# Patient Record
Sex: FEMALE | Race: WHITE | URBAN - METROPOLITAN AREA
[De-identification: names, ages, dates, MRNs, and addresses within clinical notes are randomized per-mention and may not be internally consistent; named-entity substitution may affect disease eponyms.]

---

## 2020-08-12 ENCOUNTER — APPOINTMENT (RX ONLY)
Dept: URBAN - METROPOLITAN AREA CLINIC 377 | Facility: CLINIC | Age: 55
Setting detail: DERMATOLOGY
End: 2020-08-12

## 2020-08-12 VITALS — TEMPERATURE: 97.5 F

## 2020-08-12 DIAGNOSIS — D18.0 HEMANGIOMA: ICD-10-CM

## 2020-08-12 DIAGNOSIS — L91.8 OTHER HYPERTROPHIC DISORDERS OF THE SKIN: ICD-10-CM

## 2020-08-12 DIAGNOSIS — L30.0 NUMMULAR DERMATITIS: ICD-10-CM

## 2020-08-12 DIAGNOSIS — L81.4 OTHER MELANIN HYPERPIGMENTATION: ICD-10-CM

## 2020-08-12 DIAGNOSIS — Z71.89 OTHER SPECIFIED COUNSELING: ICD-10-CM

## 2020-08-12 DIAGNOSIS — L82.1 OTHER SEBORRHEIC KERATOSIS: ICD-10-CM

## 2020-08-12 DIAGNOSIS — Z85.828 PERSONAL HISTORY OF OTHER MALIGNANT NEOPLASM OF SKIN: ICD-10-CM

## 2020-08-12 DIAGNOSIS — D22 MELANOCYTIC NEVI: ICD-10-CM

## 2020-08-12 PROBLEM — D18.01 HEMANGIOMA OF SKIN AND SUBCUTANEOUS TISSUE: Status: ACTIVE | Noted: 2020-08-12

## 2020-08-12 PROBLEM — D22.5 MELANOCYTIC NEVI OF TRUNK: Status: ACTIVE | Noted: 2020-08-12

## 2020-08-12 PROCEDURE — ? ADDITIONAL NOTES

## 2020-08-12 PROCEDURE — ? COUNSELING

## 2020-08-12 PROCEDURE — ? PRESCRIPTION

## 2020-08-12 PROCEDURE — ? FULL BODY SKIN EXAM

## 2020-08-12 PROCEDURE — ? BENIGN DESTRUCTION COSMETIC

## 2020-08-12 PROCEDURE — 99203 OFFICE O/P NEW LOW 30 MIN: CPT

## 2020-08-12 RX ORDER — TRIAMCINOLONE ACETONIDE 1 MG/G
CREAM TOPICAL BID
Qty: 1 | Refills: 1 | Status: ERX | COMMUNITY
Start: 2020-08-12

## 2020-08-12 RX ADMIN — TRIAMCINOLONE ACETONIDE: 1 CREAM TOPICAL at 00:00

## 2020-08-12 ASSESSMENT — LOCATION SIMPLE DESCRIPTION DERM
LOCATION SIMPLE: CHEST
LOCATION SIMPLE: ABDOMEN
LOCATION SIMPLE: RIGHT UPPER BACK
LOCATION SIMPLE: RIGHT LOWER BACK
LOCATION SIMPLE: GROIN

## 2020-08-12 ASSESSMENT — LOCATION DETAILED DESCRIPTION DERM
LOCATION DETAILED: RIGHT SUPERIOR FLANK
LOCATION DETAILED: LEFT SUPRAPUBIC SKIN
LOCATION DETAILED: RIGHT MID-UPPER BACK
LOCATION DETAILED: RIGHT MEDIAL SUPERIOR CHEST
LOCATION DETAILED: PERIUMBILICAL SKIN
LOCATION DETAILED: RIGHT SUPERIOR LATERAL MIDBACK
LOCATION DETAILED: LEFT SUPERIOR FLANK
LOCATION DETAILED: EPIGASTRIC SKIN

## 2020-08-12 ASSESSMENT — LOCATION ZONE DERM: LOCATION ZONE: TRUNK

## 2020-08-12 NOTE — HPI: EVALUATION OF SKIN LESION(S)
What Type Of Note Output Would You Prefer (Optional)?: Bullet Format
Hpi Title: Evaluation of Skin Lesions
How Severe Are Your Spot(S)?: mild
Have Your Spot(S) Been Treated In The Past?: has not been treated
Additional History: Bcc nose x2 yrs ago tx with Mohs @[facility]

## 2020-08-12 NOTE — HPI: SKIN LESION (SKIN TAGS)
How Severe Are They?: mild
Is This A New Presentation, Or A Follow-Up?: Skin Lesions
Additional History: Multiple possible skin tags back/bra line. Advised if true skin tags this would be separate cosmetic fee.

## 2020-08-12 NOTE — PROCEDURE: BENIGN DESTRUCTION COSMETIC
Anesthesia Volume In Cc: 0.3
Post-Care Instructions: I reviewed with the patient in detail post-care instructions. Patient is to wear sunprotection, and avoid picking at any of the treated lesions. Pt may apply Vaseline to crusted or scabbing areas.
Price (Use Numbers Only, No Special Characters Or $): 100
Consent: The patient's consent was obtained including but not limited to risks of crusting, scabbing, blistering, scarring, darker or lighter pigmentary change, recurrence, incomplete removal and infection.
Detail Level: Simple

## 2022-03-10 ENCOUNTER — HOSPITAL ENCOUNTER (EMERGENCY)
Facility: CLINIC | Age: 57
Discharge: HOME | End: 2022-03-10
Attending: EMERGENCY MEDICINE | Admitting: EMERGENCY MEDICINE
Payer: COMMERCIAL

## 2022-03-10 ENCOUNTER — APPOINTMENT (OUTPATIENT)
Dept: CT IMAGING | Facility: CLINIC | Age: 57
End: 2022-03-10
Attending: EMERGENCY MEDICINE
Payer: COMMERCIAL

## 2022-03-10 VITALS
WEIGHT: 220 LBS | DIASTOLIC BLOOD PRESSURE: 70 MMHG | HEART RATE: 70 BPM | TEMPERATURE: 98.6 F | RESPIRATION RATE: 20 BRPM | HEIGHT: 65 IN | OXYGEN SATURATION: 97 % | BODY MASS INDEX: 36.65 KG/M2 | SYSTOLIC BLOOD PRESSURE: 126 MMHG

## 2022-03-10 DIAGNOSIS — K52.9 ENTERITIS: ICD-10-CM

## 2022-03-10 LAB
ALBUMIN SERPL-MCNC: 3.3 G/DL (ref 3.5–5)
ALBUMIN UR-MCNC: 10 MG/DL
ALP SERPL-CCNC: 50 U/L (ref 45–120)
ALT SERPL W P-5'-P-CCNC: 47 U/L (ref 0–45)
ANION GAP SERPL CALCULATED.3IONS-SCNC: 8 MMOL/L (ref 5–18)
APPEARANCE UR: CLEAR
AST SERPL W P-5'-P-CCNC: 28 U/L (ref 0–40)
BASOPHILS # BLD AUTO: 0 10E3/UL (ref 0–0.2)
BASOPHILS NFR BLD AUTO: 1 %
BILIRUB DIRECT SERPL-MCNC: 0.2 MG/DL
BILIRUB SERPL-MCNC: 0.5 MG/DL (ref 0–1)
BILIRUB UR QL STRIP: NEGATIVE
BUN SERPL-MCNC: 7 MG/DL (ref 8–22)
CALCIUM SERPL-MCNC: 8.7 MG/DL (ref 8.5–10.5)
CHLORIDE BLD-SCNC: 104 MMOL/L (ref 98–107)
CO2 SERPL-SCNC: 27 MMOL/L (ref 22–31)
COLOR UR AUTO: YELLOW
CREAT SERPL-MCNC: 0.67 MG/DL (ref 0.6–1.1)
EOSINOPHIL # BLD AUTO: 0.1 10E3/UL (ref 0–0.7)
EOSINOPHIL NFR BLD AUTO: 2 %
ERYTHROCYTE [DISTWIDTH] IN BLOOD BY AUTOMATED COUNT: 12.7 % (ref 10–15)
GFR SERPL CREATININE-BSD FRML MDRD: >90 ML/MIN/1.73M2
GLUCOSE BLD-MCNC: 107 MG/DL (ref 70–125)
GLUCOSE UR STRIP-MCNC: NEGATIVE MG/DL
HCT VFR BLD AUTO: 39 % (ref 35–47)
HGB BLD-MCNC: 13.3 G/DL (ref 11.7–15.7)
HGB UR QL STRIP: NEGATIVE
IMM GRANULOCYTES # BLD: 0 10E3/UL
IMM GRANULOCYTES NFR BLD: 1 %
KETONES UR STRIP-MCNC: NEGATIVE MG/DL
LEUKOCYTE ESTERASE UR QL STRIP: NEGATIVE
LIPASE SERPL-CCNC: 43 U/L (ref 0–52)
LYMPHOCYTES # BLD AUTO: 1.9 10E3/UL (ref 0.8–5.3)
LYMPHOCYTES NFR BLD AUTO: 46 %
MCH RBC QN AUTO: 30.4 PG (ref 26.5–33)
MCHC RBC AUTO-ENTMCNC: 34.1 G/DL (ref 31.5–36.5)
MCV RBC AUTO: 89 FL (ref 78–100)
MONOCYTES # BLD AUTO: 0.5 10E3/UL (ref 0–1.3)
MONOCYTES NFR BLD AUTO: 12 %
MUCOUS THREADS #/AREA URNS LPF: PRESENT /LPF
NEUTROPHILS # BLD AUTO: 1.5 10E3/UL (ref 1.6–8.3)
NEUTROPHILS NFR BLD AUTO: 38 %
NITRATE UR QL: NEGATIVE
NRBC # BLD AUTO: 0 10E3/UL
NRBC BLD AUTO-RTO: 0 /100
PH UR STRIP: 6.5 [PH] (ref 5–7)
PLATELET # BLD AUTO: 214 10E3/UL (ref 150–450)
POTASSIUM BLD-SCNC: 3.4 MMOL/L (ref 3.5–5)
PROT SERPL-MCNC: 6.2 G/DL (ref 6–8)
RBC # BLD AUTO: 4.37 10E6/UL (ref 3.8–5.2)
RBC URINE: 3 /HPF
SODIUM SERPL-SCNC: 139 MMOL/L (ref 136–145)
SP GR UR STRIP: >1.05 (ref 1–1.03)
SQUAMOUS EPITHELIAL: 2 /HPF
UROBILINOGEN UR STRIP-MCNC: <2 MG/DL
WBC # BLD AUTO: 4 10E3/UL (ref 4–11)
WBC URINE: 1 /HPF

## 2022-03-10 PROCEDURE — 74177 CT ABD & PELVIS W/CONTRAST: CPT

## 2022-03-10 PROCEDURE — 250N000011 HC RX IP 250 OP 636: Performed by: EMERGENCY MEDICINE

## 2022-03-10 PROCEDURE — 36415 COLL VENOUS BLD VENIPUNCTURE: CPT | Performed by: EMERGENCY MEDICINE

## 2022-03-10 PROCEDURE — 85004 AUTOMATED DIFF WBC COUNT: CPT | Performed by: EMERGENCY MEDICINE

## 2022-03-10 PROCEDURE — 83690 ASSAY OF LIPASE: CPT | Performed by: EMERGENCY MEDICINE

## 2022-03-10 PROCEDURE — 82248 BILIRUBIN DIRECT: CPT | Performed by: EMERGENCY MEDICINE

## 2022-03-10 PROCEDURE — 258N000003 HC RX IP 258 OP 636: Performed by: EMERGENCY MEDICINE

## 2022-03-10 PROCEDURE — 96361 HYDRATE IV INFUSION ADD-ON: CPT

## 2022-03-10 PROCEDURE — 81001 URINALYSIS AUTO W/SCOPE: CPT | Performed by: EMERGENCY MEDICINE

## 2022-03-10 PROCEDURE — 96374 THER/PROPH/DIAG INJ IV PUSH: CPT | Mod: 59

## 2022-03-10 PROCEDURE — 99285 EMERGENCY DEPT VISIT HI MDM: CPT | Mod: 25

## 2022-03-10 PROCEDURE — 82310 ASSAY OF CALCIUM: CPT | Performed by: EMERGENCY MEDICINE

## 2022-03-10 RX ORDER — PANTOPRAZOLE SODIUM 20 MG/1
40 TABLET, DELAYED RELEASE ORAL DAILY
Qty: 30 TABLET | Refills: 0 | Status: ON HOLD | OUTPATIENT
Start: 2022-03-10 | End: 2023-12-28

## 2022-03-10 RX ORDER — ONDANSETRON 4 MG/1
4 TABLET, ORALLY DISINTEGRATING ORAL EVERY 8 HOURS PRN
Qty: 10 TABLET | Refills: 0 | Status: SHIPPED | OUTPATIENT
Start: 2022-03-10 | End: 2022-03-13

## 2022-03-10 RX ORDER — HYDROCODONE BITARTRATE AND ACETAMINOPHEN 5; 325 MG/1; MG/1
1 TABLET ORAL EVERY 6 HOURS PRN
Qty: 5 TABLET | Refills: 0 | Status: SHIPPED | OUTPATIENT
Start: 2022-03-10 | End: 2022-03-13

## 2022-03-10 RX ORDER — IOPAMIDOL 755 MG/ML
100 INJECTION, SOLUTION INTRAVASCULAR ONCE
Status: COMPLETED | OUTPATIENT
Start: 2022-03-10 | End: 2022-03-10

## 2022-03-10 RX ORDER — ONDANSETRON 2 MG/ML
4 INJECTION INTRAMUSCULAR; INTRAVENOUS ONCE
Status: COMPLETED | OUTPATIENT
Start: 2022-03-10 | End: 2022-03-10

## 2022-03-10 RX ADMIN — ONDANSETRON 4 MG: 2 INJECTION INTRAMUSCULAR; INTRAVENOUS at 12:17

## 2022-03-10 RX ADMIN — IOPAMIDOL 100 ML: 755 INJECTION, SOLUTION INTRAVENOUS at 12:54

## 2022-03-10 RX ADMIN — SODIUM CHLORIDE 1000 ML: 9 INJECTION, SOLUTION INTRAVENOUS at 12:17

## 2022-03-10 NOTE — ED NOTES
Pt concerned about pain control while at home. MD notified, script added for the pain control. Pt agree's with discharge instructions. Pt advised to eat bland diet, and slowly get back to a regular diet as tolerated.

## 2022-03-10 NOTE — ED PROVIDER NOTES
EMERGENCY DEPARTMENT ENCOUNTER      NAME: Heidy Crain  AGE: 56 year old female  YOB: 1965  MRN: [id number]  EVALUATION DATE & TIME: 3/10/2022 11:45 AM    PCP: No primary care provider on file.    ED PROVIDER: Philippe Gar D.O.      Chief Complaint   Patient presents with     Abdominal Pain     5-6 days       FINAL IMPRESSION:  1. Enteritis        ED COURSE & MEDICAL DECISION MAKIN:52 AM I met with the patient to gather history and to perform my initial exam. I discussed the plan for care while in the Emergency Department.  1:40 PM We discussed the plan for discharge and the patient is agreeable. Reviewed supportive cares, symptomatic treatment, outpatient follow up, and reasons to return to the Emergency Department. Patient to be discharged by ED RN.          Pertinent Labs & Imaging studies reviewed. (See chart for details)  56 year old female presents to the Emergency Department for evaluation of abdominal pain.  Patient's pain was primarily in the upper mid abdomen.  She had a minimally elevated ALT, however I do not believe this to be of significance at this time.  CT imaging of her abdomen does show what appears to be an infectious enteritis (suspect viral), which could easily explain her symptoms based on her exam.  There is no evidence of surgical abdomen to include bowel obstruction, mesenteric ischemia, volvulus, appendicitis, or other emergent process such as pancreatitis or diverticulitis.  At this time I do suspect that her enteritis is the underlying cause of her symptoms and will discharge her with symptomatic control and have her follow-up with her primary care provider as an outpatient.  Return precautions were discussed.    At the conclusion of the encounter I discussed the results of all of the tests and the disposition. The questions were answered. The patient or family acknowledged understanding and was agreeable with the care plan.      HPI    Patient information was  obtained from: Patient    Use of : N/A      Heidy GUY Paras is a 56 year old female who presents with abdominal pain.     Patient reports worsening abdominal pain since 3/8/22. She states that the pain is provoked with drinking, eating, and movement. As a result, patient has not been hydrating well. She describes the pain to radiate throughout the digestive tract, but is worse in the upper quadrants. Her pain is constant in nature. Patient has further not been able to have regular bowel movements or pass gas. She has had a few episodes of emesis. Patient is concerned about a possible obstruction.     Patient has been recovering from the flu and reports her fever broke 3/8 but she still has symptoms of diarrhea. She had a recent surgery on a hiatal hernia ~1 year ago, and a cholecystectomy ~2 years ago. Patient denies any other medical history.     Shx: Patient is a former smoker and occasional drinker.       REVIEW OF SYSTEMS  Constitutional:  Denies fever, chills, weight loss or weakness  Eyes:  No pain, discharge, redness  HENT:  Denies sore throat, ear pain, congestion  Respiratory: No SOB, wheeze or cough  Cardiovascular:  No CP, palpitations  GI:  Positive for abdominal pain, vomit, diarrhea.   : Denies dysuria, hematuria  Musculoskeletal:  Denies any new muscle/joint pain, swelling or loss of function.  Skin:  Denies rash, pallor  Neurologic:  Denies headache, focal weakness or sensory changes  Lymph: Denies swollen nodes    All other systems negative unless noted in HPI.    PAST MEDICAL HISTORY:  No past medical history on file.    PAST SURGICAL HISTORY:  No past surgical history on file.      CURRENT MEDICATIONS:    No current facility-administered medications for this encounter.     Current Outpatient Medications   Medication     ondansetron (ZOFRAN ODT) 4 MG ODT tab     pantoprazole (PROTONIX) 20 MG EC tablet         ALLERGIES:  No Known Allergies    FAMILY HISTORY:  No family history on  "file.    SOCIAL HISTORY:  Social History     Socioeconomic History     Marital status: Not on file     Spouse name: Not on file     Number of children: Not on file     Years of education: Not on file     Highest education level: Not on file   Occupational History     Not on file   Tobacco Use     Smoking status: Not on file     Smokeless tobacco: Not on file   Substance and Sexual Activity     Alcohol use: Not on file     Drug use: Not on file     Sexual activity: Not on file   Other Topics Concern     Not on file   Social History Narrative     Not on file     Social Determinants of Health     Financial Resource Strain: Not on file   Food Insecurity: Not on file   Transportation Needs: Not on file   Physical Activity: Not on file   Stress: Not on file   Social Connections: Not on file   Intimate Partner Violence: Not on file   Housing Stability: Not on file       VITALS:  Patient Vitals for the past 24 hrs:   BP Temp Temp src Pulse Resp Height Weight   03/10/22 1136 (!) 138/90 98.6  F (37  C) Temporal 76 20 1.651 m (5' 5\") 99.8 kg (220 lb)       PHYSICAL EXAM    VITAL SIGNS: BP (!) 138/90   Pulse 76   Temp 98.6  F (37  C) (Temporal)   Resp 20   Ht 1.651 m (5' 5\")   Wt 99.8 kg (220 lb)   BMI 36.61 kg/m      General Appearance: well-nourished. In no acute distress but uncomfortable appearing.   Head:  Normocephalic, without obvious abnormality, atraumatic  Eyes:  PERRL, conjunctiva/corneas clear, EOM's intact,  ENT:  Lips, mucosa, and tongue normal, membranes are moist without pallor  Neck:  Normal ROM, symmetrical, trachea midline    Chest:  No tenderness or deformity, no crepitus  Cardio:  Regular rate and rhythm, no murmur, rub or gallop, 2+ pulses symmetric in all extremities  Pulm:  Clear to auscultation bilaterally, respirations unlabored,  Back:  ROM normal, no CVA tenderness, no spinal tenderness, no paraspinal tenderness  Abdomen:  Soft, no rebound or guarding. Diffuse abdominal tenderness worse in the " upper quadrants.   Musculoskeletal: Full ROM, no edema, no cyanosis, good ROM of major joints  Integument:  Warm, Dry, No erythema, No rash.    Neurologic:  Alert & oriented.  No focal deficits appreciated.  Ambulatory.  Psychiatric:  Affect normal, Judgment normal, Mood normal.      LABS  Results for orders placed or performed during the hospital encounter of 03/10/22 (from the past 24 hour(s))   CBC with platelets + differential    Narrative    The following orders were created for panel order CBC with platelets + differential.  Procedure                               Abnormality         Status                     ---------                               -----------         ------                     CBC with platelets and d...[[id number]]  Abnormal            Final result                 Please view results for these tests on the individual orders.   Basic metabolic panel   Result Value Ref Range    Sodium 139 136 - 145 mmol/L    Potassium 3.4 (L) 3.5 - 5.0 mmol/L    Chloride 104 98 - 107 mmol/L    Carbon Dioxide (CO2) 27 22 - 31 mmol/L    Anion Gap 8 5 - 18 mmol/L    Urea Nitrogen 7 (L) 8 - 22 mg/dL    Creatinine 0.67 0.60 - 1.10 mg/dL    Calcium 8.7 8.5 - 10.5 mg/dL    Glucose 107 70 - 125 mg/dL    GFR Estimate >90 >60 mL/min/1.73m2   Hepatic function panel   Result Value Ref Range    Bilirubin Total 0.5 0.0 - 1.0 mg/dL    Bilirubin Direct 0.2 <=0.5 mg/dL    Protein Total 6.2 6.0 - 8.0 g/dL    Albumin 3.3 (L) 3.5 - 5.0 g/dL    Alkaline Phosphatase 50 45 - 120 U/L    AST 28 0 - 40 U/L    ALT 47 (H) 0 - 45 U/L   Lipase   Result Value Ref Range    Lipase 43 0 - 52 U/L   CBC with platelets and differential   Result Value Ref Range    WBC Count 4.0 4.0 - 11.0 10e3/uL    RBC Count 4.37 3.80 - 5.20 10e6/uL    Hemoglobin 13.3 11.7 - 15.7 g/dL    Hematocrit 39.0 35.0 - 47.0 %    MCV 89 78 - 100 fL    MCH 30.4 26.5 - 33.0 pg    MCHC 34.1 31.5 - 36.5 g/dL    RDW 12.7 10.0 - 15.0 %    Platelet Count 214 150 - 450 10e3/uL     % Neutrophils 38 %    % Lymphocytes 46 %    % Monocytes 12 %    % Eosinophils 2 %    % Basophils 1 %    % Immature Granulocytes 1 %    NRBCs per 100 WBC 0 <1 /100    Absolute Neutrophils 1.5 (L) 1.6 - 8.3 10e3/uL    Absolute Lymphocytes 1.9 0.8 - 5.3 10e3/uL    Absolute Monocytes 0.5 0.0 - 1.3 10e3/uL    Absolute Eosinophils 0.1 0.0 - 0.7 10e3/uL    Absolute Basophils 0.0 0.0 - 0.2 10e3/uL    Absolute Immature Granulocytes 0.0 <=0.4 10e3/uL    Absolute NRBCs 0.0 10e3/uL   CT Abdomen Pelvis w Contrast    Narrative    EXAM: CT ABDOMEN PELVIS W CONTRAST  LOCATION: [facility]  DATE/TIME: 3/10/2022 12:53 PM    INDICATION: Abdominal pain, evaluate for obstruction  COMPARISON: None.  TECHNIQUE: CT scan of the abdomen and pelvis was performed following injection of IV contrast. Multiplanar reformats were obtained. Dose reduction techniques were used.  CONTRAST: 100ml Isovue 370    FINDINGS:   LOWER CHEST: There is a 1.2 x 2.0 cm enlarged right cardiophrenic lymph nodes in the lower mediastinum, which has fat attenuation hilum.    HEPATOBILIARY: Prior cholecystectomy. No bile duct enlargement. ~5 mm low-attenuation lesion in the posterior right lobe (series 3, image 57) is too small to characterize likely a small cyst or hemangioma. No other liver parenchymal lesions. Smooth liver   capsule.    PANCREAS: Normal.    SPLEEN: Normal.    ADRENAL GLANDS: Normal.    KIDNEYS/BLADDER: 3-4 mm nonobstructing calculus lower pole right kidney (series 3, image 99). Left kidney is normal in size without nephrolithiasis. Renal parenchymal enhancement and cortical thickness is normal. Ureters are decompressed. Urinary bladder   is normal.    BOWEL: Nondistended stomach. The proximal small bowel is normal caliber. There are several loops of the mid small bowel in the central abdomen which have wall thickening, mucosal hyperenhancement and submucosal edema (series 5, image 25). The distal   small bowel including the  terminal ileum has normal wall thickness. Normal colonic stool burden. No colon wall thickening. Normal appendix.    LYMPH NODES: There are a few reactive appearing lymph nodes in the small bowel mesentery largest of which measures 8 mm short axis (series 3, image 104). No leo hepatis, aortocaval, or pelvic adenopathy.    VASCULATURE: Normal caliber abdominal aorta and iliac arteries.    PELVIC ORGANS: Small amount of free fluid is present layering into the pelvis. The uterus is normal in size. Normal adnexa.    MUSCULOSKELETAL: Degenerative disc space narrowing at L5-S1. Trace degenerative anterolisthesis of L4 on L5. Lower thoracic and lumbar vertebra are maintained in height. There are no aggressive or destructive bone lesions. Body wall soft tissues are   symmetric. Subcentimeter fat-containing periumbilical hernia 1.3 cm above the umbilicus.      Impression    IMPRESSION:     1.  Several loops of the mid small bowel in the central abdomen have mucosal enhancement and submucosal edema producing wall thickening consistent with enteritis, most likely infectious. There is no associated mechanical obstruction.  2.  Solitary 3-4 mm nonobstructing right renal calculus.           RADIOLOGY  CT Abdomen Pelvis w Contrast   Final Result   IMPRESSION:       1.  Several loops of the mid small bowel in the central abdomen have mucosal enhancement and submucosal edema producing wall thickening consistent with enteritis, most likely infectious. There is no associated mechanical obstruction.   2.  Solitary 3-4 mm nonobstructing right renal calculus.              MEDICATIONS GIVEN IN THE EMERGENCY:  Medications   0.9% sodium chloride BOLUS (1,000 mLs Intravenous New Bag 3/10/22 1217)   ondansetron (ZOFRAN) injection 4 mg (4 mg Intravenous Given 3/10/22 1217)   iopamidol (ISOVUE-370) solution 100 mL (100 mLs Intravenous Given 3/10/22 1254)       NEW PRESCRIPTIONS STARTED AT TODAY'S ER VISIT  New Prescriptions    ONDANSETRON  (ZOFRAN ODT) 4 MG ODT TAB    Take 1 tablet (4 mg) by mouth every 8 hours as needed for nausea    PANTOPRAZOLE (PROTONIX) 20 MG EC TABLET    Take 2 tablets (40 mg) by mouth daily        I, Evelia Guerrero, am serving as a scribe to document services personally performed by Philippe Gar, based on my observation and the provider's statements to me. I, Philippe Gar DO, attest that Evelia Guerrero is acting in a scribe capacity, has observed my performance of the services and has documented them in accordance with my direction.    Philippe Gar D.O.  Emergency Medicine  [facility] EMERGENCY ROOM  [facility] [facility] [id number] [phone]  Dept: [phone]     Philippe Gar DO  03/10/22 [id number]

## 2022-03-10 NOTE — ED TRIAGE NOTES
Larissa patient.  Has had previous hernia surgeries.  Has been sick since Saturday night.  1st 2 days vomiting and diarrhea. Then gas. Used immodium  Now not having any BM today.    Worried about adhesins and HAYLEY

## 2023-12-27 PROCEDURE — 96374 THER/PROPH/DIAG INJ IV PUSH: CPT | Mod: 59

## 2023-12-27 PROCEDURE — 99285 EMERGENCY DEPT VISIT HI MDM: CPT | Mod: 25

## 2023-12-27 PROCEDURE — 96375 TX/PRO/DX INJ NEW DRUG ADDON: CPT

## 2023-12-27 PROCEDURE — 96361 HYDRATE IV INFUSION ADD-ON: CPT

## 2023-12-28 ENCOUNTER — HOSPITAL ENCOUNTER (INPATIENT)
Facility: CLINIC | Age: 58
LOS: 3 days | Discharge: HOME | End: 2023-12-31
Attending: INTERNAL MEDICINE | Admitting: STUDENT IN AN ORGANIZED HEALTH CARE EDUCATION/TRAINING PROGRAM
Payer: COMMERCIAL

## 2023-12-28 ENCOUNTER — APPOINTMENT (OUTPATIENT)
Dept: CT IMAGING | Facility: CLINIC | Age: 58
End: 2023-12-28
Attending: EMERGENCY MEDICINE
Payer: COMMERCIAL

## 2023-12-28 ENCOUNTER — APPOINTMENT (OUTPATIENT)
Dept: ULTRASOUND IMAGING | Facility: CLINIC | Age: 58
End: 2023-12-28
Attending: EMERGENCY MEDICINE
Payer: COMMERCIAL

## 2023-12-28 ENCOUNTER — HOSPITAL ENCOUNTER (EMERGENCY)
Facility: CLINIC | Age: 58
Discharge: ANOTHER HOSPITAL | End: 2023-12-28
Attending: EMERGENCY MEDICINE | Admitting: EMERGENCY MEDICINE
Payer: COMMERCIAL

## 2023-12-28 VITALS
HEART RATE: 77 BPM | OXYGEN SATURATION: 98 % | RESPIRATION RATE: 17 BRPM | TEMPERATURE: 98.1 F | SYSTOLIC BLOOD PRESSURE: 135 MMHG | DIASTOLIC BLOOD PRESSURE: 71 MMHG

## 2023-12-28 DIAGNOSIS — I10 PRIMARY HYPERTENSION: ICD-10-CM

## 2023-12-28 DIAGNOSIS — F41.1 ANXIETY STATE: ICD-10-CM

## 2023-12-28 DIAGNOSIS — F10.90 HEAVY ALCOHOL USE: ICD-10-CM

## 2023-12-28 DIAGNOSIS — K85.90 ACUTE PANCREATITIS, UNSPECIFIED COMPLICATION STATUS, UNSPECIFIED PANCREATITIS TYPE: ICD-10-CM

## 2023-12-28 DIAGNOSIS — F10.10 ALCOHOL ABUSE: Primary | ICD-10-CM

## 2023-12-28 PROBLEM — K44.9 DIAPHRAGMATIC HERNIA: Status: ACTIVE | Noted: 2020-09-22

## 2023-12-28 PROBLEM — H04.123 DRY EYES: Status: ACTIVE | Noted: 2023-12-28

## 2023-12-28 PROBLEM — F32.0 DEPRESSION, MAJOR, SINGLE EPISODE, MILD (H): Status: ACTIVE | Noted: 2021-05-12

## 2023-12-28 PROBLEM — E66.811 OBESITY, CLASS I, BMI 30-34.9: Status: ACTIVE | Noted: 2020-09-22

## 2023-12-28 LAB
ALBUMIN SERPL BCG-MCNC: 4.2 G/DL (ref 3.5–5.2)
ALP SERPL-CCNC: 93 U/L (ref 40–150)
ALT SERPL W P-5'-P-CCNC: 38 U/L (ref 0–50)
ANION GAP SERPL CALCULATED.3IONS-SCNC: 9 MMOL/L (ref 7–15)
AST SERPL W P-5'-P-CCNC: 21 U/L (ref 0–45)
BASOPHILS # BLD AUTO: 0 10E3/UL (ref 0–0.2)
BASOPHILS NFR BLD AUTO: 1 %
BILIRUB SERPL-MCNC: 0.4 MG/DL
BUN SERPL-MCNC: 8.1 MG/DL (ref 8–23)
CALCIUM SERPL-MCNC: 9.1 MG/DL (ref 8.8–10.2)
CHLORIDE SERPL-SCNC: 102 MMOL/L (ref 98–107)
CHOLEST SERPL-MCNC: 208 MG/DL
CREAT SERPL-MCNC: 0.55 MG/DL (ref 0.51–0.95)
DEPRECATED HCO3 PLAS-SCNC: 28 MMOL/L (ref 22–29)
EGFRCR SERPLBLD CKD-EPI 2021: >90 ML/MIN/1.73M2
EOSINOPHIL # BLD AUTO: 0.3 10E3/UL (ref 0–0.7)
EOSINOPHIL NFR BLD AUTO: 3 %
ERYTHROCYTE [DISTWIDTH] IN BLOOD BY AUTOMATED COUNT: 13 % (ref 10–15)
ETHANOL SERPL-MCNC: <0.01 G/DL
GLUCOSE SERPL-MCNC: 113 MG/DL (ref 70–99)
HCT VFR BLD AUTO: 38.8 % (ref 35–47)
HDLC SERPL-MCNC: 43 MG/DL
HGB BLD-MCNC: 13.2 G/DL (ref 11.7–15.7)
IMM GRANULOCYTES # BLD: 0.1 10E3/UL
IMM GRANULOCYTES NFR BLD: 1 %
LDLC SERPL CALC-MCNC: 124 MG/DL
LIPASE SERPL-CCNC: >3000 U/L (ref 13–60)
LYMPHOCYTES # BLD AUTO: 2.4 10E3/UL (ref 0.8–5.3)
LYMPHOCYTES NFR BLD AUTO: 28 %
MCH RBC QN AUTO: 30.6 PG (ref 26.5–33)
MCHC RBC AUTO-ENTMCNC: 34 G/DL (ref 31.5–36.5)
MCV RBC AUTO: 90 FL (ref 78–100)
MONOCYTES # BLD AUTO: 0.6 10E3/UL (ref 0–1.3)
MONOCYTES NFR BLD AUTO: 7 %
NEUTROPHILS # BLD AUTO: 5.1 10E3/UL (ref 1.6–8.3)
NEUTROPHILS NFR BLD AUTO: 60 %
NONHDLC SERPL-MCNC: 165 MG/DL
NRBC # BLD AUTO: 0 10E3/UL
NRBC BLD AUTO-RTO: 0 /100
PLATELET # BLD AUTO: 201 10E3/UL (ref 150–450)
POTASSIUM SERPL-SCNC: 4.1 MMOL/L (ref 3.4–5.3)
PROT SERPL-MCNC: 7 G/DL (ref 6.4–8.3)
RBC # BLD AUTO: 4.31 10E6/UL (ref 3.8–5.2)
SODIUM SERPL-SCNC: 139 MMOL/L (ref 135–145)
TRIGL SERPL-MCNC: 203 MG/DL
WBC # BLD AUTO: 8.5 10E3/UL (ref 4–11)

## 2023-12-28 PROCEDURE — 96375 TX/PRO/DX INJ NEW DRUG ADDON: CPT

## 2023-12-28 PROCEDURE — 258N000003 HC RX IP 258 OP 636: Performed by: EMERGENCY MEDICINE

## 2023-12-28 PROCEDURE — 36415 COLL VENOUS BLD VENIPUNCTURE: CPT | Performed by: EMERGENCY MEDICINE

## 2023-12-28 PROCEDURE — 82077 ASSAY SPEC XCP UR&BREATH IA: CPT | Performed by: EMERGENCY MEDICINE

## 2023-12-28 PROCEDURE — 250N000011 HC RX IP 250 OP 636: Performed by: EMERGENCY MEDICINE

## 2023-12-28 PROCEDURE — 82465 ASSAY BLD/SERUM CHOLESTEROL: CPT | Performed by: PHYSICIAN ASSISTANT

## 2023-12-28 PROCEDURE — 96361 HYDRATE IV INFUSION ADD-ON: CPT

## 2023-12-28 PROCEDURE — 99223 1ST HOSP IP/OBS HIGH 75: CPT | Performed by: PHYSICIAN ASSISTANT

## 2023-12-28 PROCEDURE — 76705 ECHO EXAM OF ABDOMEN: CPT

## 2023-12-28 PROCEDURE — 250N000013 HC RX MED GY IP 250 OP 250 PS 637: Performed by: PHYSICIAN ASSISTANT

## 2023-12-28 PROCEDURE — 258N000003 HC RX IP 258 OP 636: Performed by: PHYSICIAN ASSISTANT

## 2023-12-28 PROCEDURE — 80053 COMPREHEN METABOLIC PANEL: CPT | Performed by: EMERGENCY MEDICINE

## 2023-12-28 PROCEDURE — 250N000011 HC RX IP 250 OP 636: Performed by: PHYSICIAN ASSISTANT

## 2023-12-28 PROCEDURE — 96374 THER/PROPH/DIAG INJ IV PUSH: CPT | Mod: 59

## 2023-12-28 PROCEDURE — 120N000001 HC R&B MED SURG/OB

## 2023-12-28 PROCEDURE — 36415 COLL VENOUS BLD VENIPUNCTURE: CPT | Performed by: PHYSICIAN ASSISTANT

## 2023-12-28 PROCEDURE — 85025 COMPLETE CBC W/AUTO DIFF WBC: CPT | Performed by: EMERGENCY MEDICINE

## 2023-12-28 PROCEDURE — 83690 ASSAY OF LIPASE: CPT | Performed by: EMERGENCY MEDICINE

## 2023-12-28 PROCEDURE — 74177 CT ABD & PELVIS W/CONTRAST: CPT

## 2023-12-28 RX ORDER — TEA TREE OIL 100 %
1 OIL (ML) TOPICAL 2 TIMES DAILY
COMMUNITY

## 2023-12-28 RX ORDER — ASCORBIC ACID 500 MG
1 TABLET ORAL 2 TIMES DAILY
COMMUNITY

## 2023-12-28 RX ORDER — PROCHLORPERAZINE 25 MG
25 SUPPOSITORY, RECTAL RECTAL EVERY 12 HOURS PRN
Status: DISCONTINUED | OUTPATIENT
Start: 2023-12-28 | End: 2023-12-31 | Stop reason: HOSPADM

## 2023-12-28 RX ORDER — CYCLOSPORINE 0.5 MG/ML
1 EMULSION OPHTHALMIC 2 TIMES DAILY
Status: DISCONTINUED | OUTPATIENT
Start: 2023-12-28 | End: 2023-12-31 | Stop reason: HOSPADM

## 2023-12-28 RX ORDER — SENNOSIDES 8.6 MG
1 TABLET ORAL AT BEDTIME
COMMUNITY

## 2023-12-28 RX ORDER — DULOXETIN HYDROCHLORIDE 30 MG/1
60 CAPSULE, DELAYED RELEASE ORAL DAILY
Status: DISCONTINUED | OUTPATIENT
Start: 2023-12-28 | End: 2023-12-28

## 2023-12-28 RX ORDER — CHOLECALCIFEROL (VITAMIN D3) 50 MCG
1 TABLET ORAL DAILY
COMMUNITY

## 2023-12-28 RX ORDER — HYDROMORPHONE HYDROCHLORIDE 1 MG/ML
0.5 INJECTION, SOLUTION INTRAMUSCULAR; INTRAVENOUS; SUBCUTANEOUS ONCE
Status: COMPLETED | OUTPATIENT
Start: 2023-12-28 | End: 2023-12-28

## 2023-12-28 RX ORDER — DULOXETIN HYDROCHLORIDE 30 MG/1
60 CAPSULE, DELAYED RELEASE ORAL DAILY
Status: DISCONTINUED | OUTPATIENT
Start: 2023-12-28 | End: 2023-12-31 | Stop reason: HOSPADM

## 2023-12-28 RX ORDER — OXYCODONE HYDROCHLORIDE 5 MG/1
5 TABLET ORAL EVERY 4 HOURS PRN
Status: DISCONTINUED | OUTPATIENT
Start: 2023-12-28 | End: 2023-12-31 | Stop reason: HOSPADM

## 2023-12-28 RX ORDER — PROCHLORPERAZINE MALEATE 10 MG
10 TABLET ORAL EVERY 6 HOURS PRN
Status: DISCONTINUED | OUTPATIENT
Start: 2023-12-28 | End: 2023-12-31 | Stop reason: HOSPADM

## 2023-12-28 RX ORDER — SALIVA STIMULANT COMB. NO.3
2 SPRAY, NON-AEROSOL (ML) MUCOUS MEMBRANE
Status: DISCONTINUED | OUTPATIENT
Start: 2023-12-28 | End: 2023-12-31 | Stop reason: HOSPADM

## 2023-12-28 RX ORDER — IOPAMIDOL 755 MG/ML
90 INJECTION, SOLUTION INTRAVASCULAR ONCE
Status: COMPLETED | OUTPATIENT
Start: 2023-12-28 | End: 2023-12-28

## 2023-12-28 RX ORDER — NALOXONE HYDROCHLORIDE 0.4 MG/ML
0.4 INJECTION, SOLUTION INTRAMUSCULAR; INTRAVENOUS; SUBCUTANEOUS
Status: DISCONTINUED | OUTPATIENT
Start: 2023-12-28 | End: 2023-12-31 | Stop reason: HOSPADM

## 2023-12-28 RX ORDER — CYCLOSPORINE 0.5 MG/ML
1 EMULSION OPHTHALMIC 2 TIMES DAILY
COMMUNITY
Start: 2023-11-03

## 2023-12-28 RX ORDER — AMOXICILLIN 250 MG
2 CAPSULE ORAL 2 TIMES DAILY PRN
Status: DISCONTINUED | OUTPATIENT
Start: 2023-12-28 | End: 2023-12-31 | Stop reason: HOSPADM

## 2023-12-28 RX ORDER — DULOXETIN HYDROCHLORIDE 60 MG/1
60 CAPSULE, DELAYED RELEASE ORAL DAILY
COMMUNITY

## 2023-12-28 RX ORDER — VITAMIN B COMPLEX
1 CAPSULE ORAL DAILY
Status: ON HOLD | COMMUNITY
End: 2024-08-03

## 2023-12-28 RX ORDER — EVENING PRIMROSE OIL 500 MG
1 CAPSULE ORAL EVERY EVENING
COMMUNITY

## 2023-12-28 RX ORDER — NALOXONE HYDROCHLORIDE 0.4 MG/ML
0.2 INJECTION, SOLUTION INTRAMUSCULAR; INTRAVENOUS; SUBCUTANEOUS
Status: DISCONTINUED | OUTPATIENT
Start: 2023-12-28 | End: 2023-12-31 | Stop reason: HOSPADM

## 2023-12-28 RX ORDER — AMOXICILLIN 250 MG
1 CAPSULE ORAL 2 TIMES DAILY PRN
Status: DISCONTINUED | OUTPATIENT
Start: 2023-12-28 | End: 2023-12-31 | Stop reason: HOSPADM

## 2023-12-28 RX ORDER — ACETAMINOPHEN 325 MG/1
650 TABLET ORAL EVERY 4 HOURS PRN
Status: DISCONTINUED | OUTPATIENT
Start: 2023-12-28 | End: 2023-12-31 | Stop reason: HOSPADM

## 2023-12-28 RX ORDER — SODIUM CHLORIDE 9 MG/ML
INJECTION, SOLUTION INTRAVENOUS CONTINUOUS
Status: DISCONTINUED | OUTPATIENT
Start: 2023-12-28 | End: 2023-12-28 | Stop reason: HOSPADM

## 2023-12-28 RX ORDER — TRIAMCINOLONE ACETONIDE 1 MG/G
OINTMENT TOPICAL 2 TIMES DAILY PRN
COMMUNITY
Start: 2023-12-11

## 2023-12-28 RX ORDER — HYDROMORPHONE HCL IN WATER/PF 6 MG/30 ML
0.2 PATIENT CONTROLLED ANALGESIA SYRINGE INTRAVENOUS
Status: DISCONTINUED | OUTPATIENT
Start: 2023-12-28 | End: 2023-12-29

## 2023-12-28 RX ORDER — TACROLIMUS 1 MG/G
OINTMENT TOPICAL 2 TIMES DAILY PRN
COMMUNITY
Start: 2023-12-09

## 2023-12-28 RX ORDER — HYDROMORPHONE HCL IN WATER/PF 6 MG/30 ML
0.4 PATIENT CONTROLLED ANALGESIA SYRINGE INTRAVENOUS
Status: DISCONTINUED | OUTPATIENT
Start: 2023-12-28 | End: 2023-12-29

## 2023-12-28 RX ORDER — LIDOCAINE 40 MG/G
CREAM TOPICAL
Status: DISCONTINUED | OUTPATIENT
Start: 2023-12-28 | End: 2023-12-31 | Stop reason: HOSPADM

## 2023-12-28 RX ORDER — ONDANSETRON 4 MG/1
4 TABLET, ORALLY DISINTEGRATING ORAL EVERY 6 HOURS PRN
Status: DISCONTINUED | OUTPATIENT
Start: 2023-12-28 | End: 2023-12-31 | Stop reason: HOSPADM

## 2023-12-28 RX ORDER — ONDANSETRON 2 MG/ML
4 INJECTION INTRAMUSCULAR; INTRAVENOUS EVERY 6 HOURS PRN
Status: DISCONTINUED | OUTPATIENT
Start: 2023-12-28 | End: 2023-12-31 | Stop reason: HOSPADM

## 2023-12-28 RX ORDER — SODIUM CHLORIDE, SODIUM LACTATE, POTASSIUM CHLORIDE, CALCIUM CHLORIDE 600; 310; 30; 20 MG/100ML; MG/100ML; MG/100ML; MG/100ML
INJECTION, SOLUTION INTRAVENOUS CONTINUOUS
Status: DISCONTINUED | OUTPATIENT
Start: 2023-12-28 | End: 2023-12-31

## 2023-12-28 RX ORDER — KETOROLAC TROMETHAMINE 15 MG/ML
15 INJECTION, SOLUTION INTRAMUSCULAR; INTRAVENOUS ONCE
Status: COMPLETED | OUTPATIENT
Start: 2023-12-28 | End: 2023-12-28

## 2023-12-28 RX ORDER — ONDANSETRON 2 MG/ML
4 INJECTION INTRAMUSCULAR; INTRAVENOUS EVERY 30 MIN PRN
Status: DISCONTINUED | OUTPATIENT
Start: 2023-12-28 | End: 2023-12-28 | Stop reason: HOSPADM

## 2023-12-28 RX ADMIN — HYDROMORPHONE HYDROCHLORIDE 0.4 MG: 0.2 INJECTION, SOLUTION INTRAMUSCULAR; INTRAVENOUS; SUBCUTANEOUS at 15:31

## 2023-12-28 RX ADMIN — HYDROMORPHONE HYDROCHLORIDE 0.2 MG: 0.2 INJECTION, SOLUTION INTRAMUSCULAR; INTRAVENOUS; SUBCUTANEOUS at 20:53

## 2023-12-28 RX ADMIN — SODIUM CHLORIDE, POTASSIUM CHLORIDE, SODIUM LACTATE AND CALCIUM CHLORIDE 2000 ML: 600; 310; 30; 20 INJECTION, SOLUTION INTRAVENOUS at 09:11

## 2023-12-28 RX ADMIN — SODIUM CHLORIDE, POTASSIUM CHLORIDE, SODIUM LACTATE AND CALCIUM CHLORIDE: 600; 310; 30; 20 INJECTION, SOLUTION INTRAVENOUS at 13:27

## 2023-12-28 RX ADMIN — ACETAMINOPHEN 650 MG: 325 TABLET, FILM COATED ORAL at 14:08

## 2023-12-28 RX ADMIN — OXYCODONE HYDROCHLORIDE 2.5 MG: 5 TABLET ORAL at 11:27

## 2023-12-28 RX ADMIN — DULOXETINE HYDROCHLORIDE 60 MG: 30 CAPSULE, DELAYED RELEASE ORAL at 15:20

## 2023-12-28 RX ADMIN — CYCLOSPORINE 1 DROP: 0.5 EMULSION OPHTHALMIC at 20:46

## 2023-12-28 RX ADMIN — CYCLOSPORINE 1 DROP: 0.5 EMULSION OPHTHALMIC at 13:28

## 2023-12-28 RX ADMIN — KETOROLAC TROMETHAMINE 15 MG: 15 INJECTION, SOLUTION INTRAMUSCULAR; INTRAVENOUS at 03:20

## 2023-12-28 RX ADMIN — SODIUM CHLORIDE, POTASSIUM CHLORIDE, SODIUM LACTATE AND CALCIUM CHLORIDE: 600; 310; 30; 20 INJECTION, SOLUTION INTRAVENOUS at 20:46

## 2023-12-28 RX ADMIN — FAMOTIDINE 20 MG: 10 INJECTION, SOLUTION INTRAVENOUS at 00:24

## 2023-12-28 RX ADMIN — HYDROMORPHONE HYDROCHLORIDE 0.5 MG: 1 INJECTION, SOLUTION INTRAMUSCULAR; INTRAVENOUS; SUBCUTANEOUS at 05:50

## 2023-12-28 RX ADMIN — ONDANSETRON 4 MG: 2 INJECTION INTRAMUSCULAR; INTRAVENOUS at 03:22

## 2023-12-28 RX ADMIN — SODIUM CHLORIDE: 9 INJECTION, SOLUTION INTRAVENOUS at 03:20

## 2023-12-28 RX ADMIN — IOPAMIDOL 90 ML: 755 INJECTION, SOLUTION INTRAVENOUS at 00:58

## 2023-12-28 ASSESSMENT — ACTIVITIES OF DAILY LIVING (ADL)
ADLS_ACUITY_SCORE: 20
ADLS_ACUITY_SCORE: 35
ADLS_ACUITY_SCORE: 20

## 2023-12-28 ASSESSMENT — COLUMBIA-SUICIDE SEVERITY RATING SCALE - C-SSRS
1. IN THE PAST MONTH, HAVE YOU WISHED YOU WERE DEAD OR WISHED YOU COULD GO TO SLEEP AND NOT WAKE UP?: NO
2. HAVE YOU ACTUALLY HAD ANY THOUGHTS OF KILLING YOURSELF IN THE PAST MONTH?: NO
6. HAVE YOU EVER DONE ANYTHING, STARTED TO DO ANYTHING, OR PREPARED TO DO ANYTHING TO END YOUR LIFE?: NO

## 2023-12-28 NOTE — H&P
"[facility]    History and Physical - Hospitalist Service       Date of Admission:  12/28/2023    Assessment & Plan      Heidy Crain is a 58 year old female admitted on 12/28/2023. She has a past medical history significant for alcohol abuse /  dependence, untreated hypercholesterolemia, anxiety / depression / PTSD, dry eyes, prior diaphragmatic hernia s/p repair, cholecystitis s/p cholecystectomy, and obesity who presented to the [facility] emergency department for evaluation of abdominal pain and was found to have acute pancreatitis. No beds were available at [facility], so patient was directly admitted to [facility] for treatment.    Acute pancreatitis - most likely alcoholic pancreatitis   Presented with 3 day history of abdominal pain, elevated lipase (>3000), and peripancreatic stranding on CT imaging. Patient admits to daily alcohol use (see below), but also has uncontrolled hyperlipidemia / hypertriglyceridemia (see below). She was also on a course of doxycycline recently, and takes duloxetine, both of which are possible causes of medication induced pancreatitis per pharmacy review.   Admission triglycerides slightly elevated (203), but not significant enough to cause pancreatitis.   Etiology is most likely alcoholic pancreatitis, less likely medication induced. No evidence of necrosis or infection.   - 2L LR bolus, followed by LR @ 125 ml/hr  - NPO except meds, could try advancing to clears if patient desires  - Analgesia: prn acetaminophen, prn oxycodone, prn IV dilaudid  - Antiemetics available  - Lipase am    Alcohol abuse / dependence  Patient admits to daily drinking, usually has 1-2 a \"generous\" martinis every evening (about 2 shots of liquor per drink). No recent binge drinking. Denies any history of withdrawal or seizures, recently stopped drinking for a week without any symptoms. Has been prescribed naloxone in the past but not currently taking.   Admit " blood alcohol level negative. Last drink was on 12/24. No evidence of withdrawal at time of admission.   - No CIWA needed at this time, but monitor for evidence of withdrawal and initiate as needed  - Patient is motivated to quit, realizes this is a detriment to her health, and does not want to have pancreatitis again    Hyperlipidemia / hypertriglyceridemia   Lipid panel from Feb 2023 shows total cholesterol 319, triglycerides 365, LCL cholesterol 194, and VLDL 74. She has been reluctant to start a medication, wanted to try lifestyle modifications first.   Admission lipid panel still demonstrates hyperlipidemia / hypertriglyceridemia despite attempts at lifestyle modifications.    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 Managed prior to admission with duloxetine 60 mg daily.  - Continue duloxetine (low likelihood that this is the cause of pancreatitis)    Dry eyes  Patient has been seeing and allergist and optometrist for treatment of her chronic dry eye. Just finished a course of steroid eye drops but still on restasis.   Patient recently finished a course of doxycycline for an eye infection (although records for this are not available).   - Continue restasis            Diet: NPO for Medical/Clinical Reasons Except for: Meds, Ice Chips, can try advancing to clears if patient desires  DVT Prophylaxis: Pneumatic Compression Devices and Ambulate every shift  Ward Catheter: Not present  Lines: None     Cardiac Monitoring: None  Code Status: Full Code      Clinically Significant Risk Factors Present on Admission                                  Disposition Plan      Expected Discharge Date: 12/30/2023                The patient's care was discussed with the Attending Physician, Dr. Chad Sy and Patient.    Lynette HARRINGTON  "KIRSTEN Renteria  Hospitalist Service  [facility]  Securely message with Charly (more info)  Text page via [facility] Paging/Directory     ______________________________________________________________________    Chief Complaint   \"My abdomen has been hurting since Merissa Venessa.\"    History is obtained from the patient, review of EMR, and emergency department documentation.     History of Present Illness   Heidy Crain is a 58 year old female who presented to the emergency department for evaluation of abdominal pain.    Pain started on Merissa Venessa (5 days ago) and has been waxing and waning since onset.   Pain is located throughout the entire abdomen, flank, ribs, and into her sternum.   Pain is constant but waxes and wanes, rated 2/10 at best, 5-6/10 at worst, described as a burning sensation, but also sometimes like hunger pains as well as bruising in her lower abdomen.   She feels \"gassy\" but has not been burping or passing gas.   She was unable to sleep due to the pain, and became so uncomfortable that she could not find any position to be in without pain.   Pain is worse if she eats anything, also worse with deep inspiration.  She had some nausea that has since resolved, no vomiting or diarrhea.   Because her symptoms were not improving, she presented to the emergency department for evaluation and was found to have acute pancreatitis.    She denies any known prior history of pancreatitis in the past, but did have a somewhat similar pain once in the past (although it was found to be due to a viral GI illness).   She drinks on a daily basis - 1-2 martinis every evening while sitting and watching the news.   No prior history of withdrawal, has quit drinking for a week in the past without any problems.     She also has known high cholesterol but has been reluctant to go on medications, was trying the Mediterranean diet in hopes to improve her health.   Over Christmas she was eating a " lot more carbs than usual.   She has been on a recent course of doxycycline for an eye infection, now completed last week.  No other new medications.   No scorpion bites / exposures.     Review of systems   Her mother recently had influenza A, but patient denies any fever, chills, or respiratory changes.  Currently has a headache and some myalgias.   Possible slight dysuria.   Has been getting hives for the past few months in conjunction with her dry eyes, has been seeing an allergist and therapist without relief.   The remainder review of systems is negative.           Past Medical History    Past Medical History:   Diagnosis Date    Anxiety state 02/24/2011    Depressive disorder 08/31/2017    Diaphragmatic hernia 09/22/2020    Formatting of this note might be different from the original.   repaired 10/15/20, no mesh, this was congenital    PTSD (post-traumatic stress disorder) 05/12/2021    Formatting of this note might be different from the original.   from before and during the 10 year divorce       Past Surgical History   Past Surgical History:   Procedure Laterality Date    CHOLECYSTECTOMY      DIAPHRAGMATIC HERNIA REPAIR  2020       Prior to Admission Medications   Prior to Admission Medications   Prescriptions Last Dose Informant Patient Reported? Taking?   DULoxetine (CYMBALTA) 60 MG capsule 12/27/2023 Self Yes Yes   Sig: Take 60 mg by mouth daily   Fish Oil-Cholecalciferol (OMEGA-3 FISH OIL/VITAMIN D3) 9785-0146 MG-UNIT CAPS 12/27/2023 at am Self Yes Yes   Sig: Take 1 capsule by mouth daily   RESTASIS 0.05 % ophthalmic emulsion 12/27/2023 at am Self Yes Yes   Sig: Place 1 drop into both eyes 2 times daily   Vitamin E 45 MG (100 UNIT) CAPS 12/27/2023 Self Yes Yes   Sig: Take 1 tablet by mouth daily   sennosides (SENOKOT) 8.6 MG tablet 12/27/2023 Self Yes Yes   Sig: Take 1 tablet by mouth at bedtime   tacrolimus (PROTOPIC) 0.1 % external ointment Unknown at prn Self Yes Yes   Sig: Apply topically 2 times  daily as needed   triamcinolone (KENALOG) 0.1 % external ointment Unknown at prn Self Yes Yes   Sig: Apply topically 2 times daily as needed APPLY TOPICALLY TO THE CHEST AND NECK TWICE DAILY AS NEEDED   vitamin (B COMPLEX) capsule 12/27/2023 at am Self Yes Yes   Sig: Take 1 tablet by mouth daily   vitamin B complex with vitamin C (STRESS TAB) tablet 12/27/2023 at am Self Yes Yes   Sig: Take 1 tablet by mouth daily   vitamin C (ASCORBIC ACID) 500 MG tablet 12/27/2023 at am Self Yes Yes   Sig: Take 1 tablet by mouth 2 times daily   vitamin D3 (CHOLECALCIFEROL) 50 mcg (2000 units) tablet 12/27/2023 at am Self Yes Yes   Sig: Take 1 tablet by mouth daily      Facility-Administered Medications: None        Review of Systems    The 10 point Review of Systems is negative other than noted in the HPI or here.     Social History   I have reviewed this patient's social history and updated it with pertinent information if needed.         Family History   I have reviewed this patient's family history and updated it with pertinent information if needed.  Family History   Problem Relation Age of Onset    Atrial fibrillation Mother     Alcoholism Father     Cerebrovascular Disease Father     Alcoholism Sister     Thyroid Cancer Sister         In remission    No Known Problems Sister     No Known Problems Sister     No Known Problems Brother          Allergies   Allergies   Allergen Reactions    Atorvastatin Headache     Other Reaction(s): Other - comment required    headache    Escitalopram Rash        Physical Exam   Vital Signs: Temp: 98.1  F (36.7  C) Temp src: Oral BP: (!) 171/99 Pulse: 76   Resp: 18 SpO2: 98 % O2 Device: None (Room air)    Weight: 0 lbs 0 oz    Constitutional: Alert, oriented, cooperative. At times appears uncomfortable but not significantly distressed, appears nontoxic. Speaking in full sentences.     Eyes: Eyes are clear, pupils are reactive. No scleral icterus.     HEENT: Oropharynx is clear and moist, no  lesions. Normocephalic, no evidence of cranial trauma.      Cardiovascular: Regular rhythm and rate, normal S1 and S2. No murmur, rubs, or gallops. Peripheral pulses intact bilaterally. No lower extremity edema.    Respiratory: Lung sounds are clear to auscultation bilaterally without wheezes, rhonchi, or crackles.    GI: Dull to percussion in all quadrants. Soft, non-distended. Tender to palpation in upper quadrants > lower quadrants, no rebound or guarding. No hepatosplenomegaly or masses appreciated. Normal bowel sounds.     Musculoskeletal: Without obvious deformity, normal range of motion. Normal muscle bulk and tone. Distal CMS intact.     Skin: Warm and dry, no rashes or ecchymoses. No mottling of skin.      Neurologic: Patient moves all extremities. Gross strength and sensation are equal bilaterally.    Genitourinary: Deferred      Medical Decision Making       75 MINUTES SPENT BY ME on the date of service doing chart review, history, exam, documentation & further activities per the note.  MANAGEMENT DISCUSSED with the following over the past 24 hours: Dr. Chad Sy   NOTE(S)/MEDICAL RECORDS REVIEWED over the past 24 hours: Clinic notes in Care Everywhere - 9/12/23, 8/1/23, patient summary in Care Everywhere   Tests ORDERED & REVIEWED in the past 24 hours:  - CMP  - CBC  - LFTs  - Lipase      Data     I have personally reviewed the following data over the past 24 hrs:    8.5  \   13.2   / 201     139 102 8.1 /  113 (H)   4.1 28 0.55 \     ALT: 38 AST: 21 AP: 93 TBILI: 0.4   ALB: 4.2 TOT PROTEIN: 7.0 LIPASE: >3,000 (H)       Imaging results reviewed over the past 24 hrs:   Recent Results (from the past 24 hour(s))   CT Abdomen Pelvis w Contrast    Narrative    EXAM: CT ABDOMEN PELVIS W CONTRAST  LOCATION: [facility]  DATE: 12/28/2023    INDICATION: abd pain upper abd. bloating. previous hernia surgery. no bm x 3  COMPARISON: 03/10/2022  TECHNIQUE: CT scan of the abdomen and pelvis  was performed following injection of IV contrast. Multiplanar reformats were obtained. Dose reduction techniques were used.  CONTRAST: Isovue 370 90ml    FINDINGS:   LOWER CHEST: Normal.    HEPATOBILIARY: Diffuse hepatic steatosis. No significant mass. No bile duct dilatation. Cholecystectomy.    PANCREAS: There is mild peripancreatic stranding extending into the central mesentery. No pancreatic duct dilation or mass.    SPLEEN: Normal.    ADRENAL GLANDS: Normal.    KIDNEYS/BLADDER: There is a 4 mm nonobstructing right lower pole renal calculus. No hydronephrosis. Bladder is unremarkable.    BOWEL: Mild colonic diverticulosis. No obstruction or inflammatory change. Normal appendix.    LYMPH NODES: Normal.    VASCULATURE: Unremarkable.    PELVIC ORGANS: Normal. Trace pelvic free fluid.    MUSCULOSKELETAL: Small periumbilical fat-containing hernia.      Impression    IMPRESSION:   1.  Peripancreatic stranding suggestive of acute pancreatitis.  2.  Hepatic steatosis.  3.  Nonobstructing right nephrolithiasis.   US Abdomen Limited    Narrative    EXAM: US ABDOMEN LIMITED  LOCATION: [facility]  DATE: 12/28/2023    INDICATION: Acute pancreatitis. Prior cholecystectomy. Evaluate for obstructing biliary stone.  COMPARISON: CT abdomen and pelvis with IV contrast 12/28/2023.  TECHNIQUE: Limited abdominal ultrasound.    FINDINGS:    GALLBLADDER: Surgically absent.    BILE DUCTS: No biliary dilatation. The common duct measures 6 mm. No choledocholithiasis.    LIVER: Echogenic hepatic parenchyma without discrete lesion. Smooth hepatic contour.     RIGHT KIDNEY: No stones, masses or hydronephrosis. Right kidney measures 11.6 cm 4 4.    PANCREAS: Partially visualized due to overlying bowel gas, grossly normal where seen. Changes of acute pancreatitis were better demonstrated on CT performed earlier.    No ascites.      Impression    IMPRESSION:  1.  Cholecystectomy. No biliary ectasia or  choledocholithiasis. Echogenic hepatic parenchyma without discrete lesion.    2.  Pancreas partially visualized due to overlying bowel gas, grossly normal where seen. Changes of acute pancreatitis were better demonstrated on CT performed earlier.

## 2023-12-28 NOTE — MEDICATION SCRIBE - ADMISSION MEDICATION HISTORY
Medication Scribe Admission Medication History    Admission medication history is complete. The information provided in this note is only as accurate as the sources available at the time of the update.    Information Source(s): Patient via phone    Pertinent Information: PTA med list reviewed with patient, CareEverywhere and [facility]    Changes made to PTA medication list:  Added: Fish Oil, Restasis 0.05% Drops, Tacrolimus 0.1% ointment prn, Triamcinolone 0.1% ointment, Vitamin B Complex, Multivitamin, Vitamin C 500 mg, Vitamin D3 2000 units, Vitamin E 100 units  Deleted: Pantoprazole 40 mg  Changed: None    Medication Affordability:  Not including over the counter (OTC) medications, was there a time in the past 3 months when you did not take your medications as prescribed because of cost?: No    Allergies reviewed with patient and updates made in EHR: yes    Medication History Completed By: Courtney Jarrett 12/28/2023 9:12 AM    PTA Med List   Medication Sig Last Dose    DULoxetine (CYMBALTA) 60 MG capsule Take 60 mg by mouth daily 12/27/2023    Fish Oil-Cholecalciferol (OMEGA-3 FISH OIL/VITAMIN D3) 2793-2902 MG-UNIT CAPS Take 1 capsule by mouth daily 12/27/2023 at am    RESTASIS 0.05 % ophthalmic emulsion Place 1 drop into both eyes 2 times daily 12/27/2023 at am    sennosides (SENOKOT) 8.6 MG tablet Take 1 tablet by mouth at bedtime 12/27/2023    tacrolimus (PROTOPIC) 0.1 % external ointment Apply topically 2 times daily as needed Unknown at prn    triamcinolone (KENALOG) 0.1 % external ointment Apply topically 2 times daily as needed APPLY TOPICALLY TO THE CHEST AND NECK TWICE DAILY AS NEEDED Unknown at prn    vitamin (B COMPLEX) capsule Take 1 tablet by mouth daily 12/27/2023 at am    vitamin B complex with vitamin C (STRESS TAB) tablet Take 1 tablet by mouth daily 12/27/2023 at am    vitamin C (ASCORBIC ACID) 500 MG tablet Take 1 tablet by mouth 2 times daily 12/27/2023 at am    vitamin D3  (CHOLECALCIFEROL) 50 mcg (2000 units) tablet Take 1 tablet by mouth daily 12/27/2023 at am    Vitamin E 45 MG (100 UNIT) CAPS Take 1 tablet by mouth daily 12/27/2023

## 2023-12-28 NOTE — ED TRIAGE NOTES
Patient arrives to the ER with c/o abdominal pain x 3 days. No N/V/D. Hx of cholecystectomy, hernia repair.  Afebrile in triage.     Triage Assessment (Adult)       Row Name 12/28/23 [id number]          Triage Assessment    Airway WDL WDL        Respiratory WDL    Respiratory WDL WDL        Skin Circulation/Temperature WDL    Skin Circulation/Temperature WDL WDL        Cardiac WDL    Cardiac WDL WDL        Peripheral/Neurovascular WDL    Peripheral Neurovascular WDL WDL        Cognitive/Neuro/Behavioral WDL    Cognitive/Neuro/Behavioral WDL WDL

## 2023-12-28 NOTE — PROGRESS NOTES
SPIRITUAL HEALTH SERVICES  [facility] - Med Surg    Referral Source: Patient request    - Patient declined at this time and requested checking in with her again tomorrow.    Plan:  will remain available for any ongoing support needs during LOS.    Dl Pena M.A., [facility]  Staff   [facility]  Office: [phone]

## 2023-12-28 NOTE — PROGRESS NOTES
Called and gave report to SHERLYN Durham at [facility]. Patient to transfer by ambulance to hospital.

## 2023-12-28 NOTE — ED PROVIDER NOTES
Emergency Department Encounter     Evaluation Date & Time:   No admission date for patient encounter.    CHIEF COMPLAINT:  Abdominal Pain      Triage Note:Patient arrives to the ER with c/o abdominal pain x 3 days. No N/V/D. Hx of cholecystectomy, hernia repair.  Afebrile in triage.     Triage Assessment (Adult)       Row Name 12/28/23 0008          Triage Assessment    Airway WDL WDL        Respiratory WDL    Respiratory WDL WDL        Skin Circulation/Temperature WDL    Skin Circulation/Temperature WDL WDL        Cardiac WDL    Cardiac WDL WDL        Peripheral/Neurovascular WDL    Peripheral Neurovascular WDL WDL        Cognitive/Neuro/Behavioral WDL    Cognitive/Neuro/Behavioral WDL WDL                         FINAL IMPRESSION:    ICD-10-CM    1. Acute pancreatitis, unspecified complication status, unspecified pancreatitis type  K85.90       2. Heavy alcohol use  F10.90           Impression and Plan     ED COURSE & MEDICAL DECISION MAKING:    With her upper abdominal discomfort, although typically would include heart disease in the differential with female patient, her upper abdomen is actually tender to palpation and so I think this makes heart disease as a cause very unlikely.  She also has no associated shortness of breath with it to suggest a lower pulmonary cause.  No pulmonary symptoms at the moment either of cough or cold or shortness of breath.  Her symptoms are all GI related with lack of appetite, mild nausea, feeling bloated, and tender to palpation.  She is not on a blood thinner to suggest internal bleeding.  She has had her gallbladder removed but still could form biliary crystals causing intermittent biliary obstruction and inflammation including pancreatitis.  Also consider the possibility of gastritis or developing peptic ulcer disease.  Less likely this is related to narrowing of the gastric outlet as she really is not experiencing any vomiting with it although this can happen after a hiatal  hernia repair.  Will do blood work and CT evaluation  ED Course as of 12/28/23 0712   Thu Dec 28, 2023   0336 She does have acute pancreatitis.  Her electrolytes at this point are not severely abnormal and there is no obvious acidosis associated with it.  She is clinically somewhat dry and has not been able to eat or drink much of anything for the past few days.  She does state that the most likely trigger at this point without gallstones being present is a history of heavy alcohol abuse.  She was drinking heavily last week but has not now drink anything for about a week.  She has not experienced any symptoms of withdrawal and has quit drinking in the past without withdrawal symptoms.  She otherwise is on a steroid eyedrop and Restasis as well and has anxiety medication duloxetine and mvi, but no obvious medication trigger for pancreatitis identified.  We discussed that [facility] is full and options for transferring to another hospital.  She is agreeable to transfer.  There is no dilation of the biliary system to suggest a need for ercp at this time, so for now should be able to admit for pancreatitis likely alcohol induced and monitor for signs of etoh withdrawal.  Currently there is no withdrawal.   0547 Heidy and I discussed the availability of beds here in the ER, when it would be expected that she would be in a bed in the ER and when there might be a bed available upstairs.  She is fairly uncomfortable and so does prefer transfer as there is a bed currently available at [facility].  She continues to demonstrate no signs of alcohol withdrawal.  [facility]   0621 Patient accepted to [facility] [place]  Dr. Arce accepting the patient for admission.   0624 Her pain is much improved with the dose of dilaudid.  She is resting comfortably now per patient but remains awake.   0711 Patient signed out to Dr. Astudillo awaiting transfer to  [facility]       At the conclusion of the encounter I discussed the results of all the tests and the disposition. The questions were answered. The patient or family acknowledged understanding and was agreeable with the care plan.          0 minutes of critical care time        MEDICATIONS GIVEN IN THE EMERGENCY DEPARTMENT:  Medications   ondansetron (ZOFRAN) injection 4 mg (4 mg Intravenous $Given 12/28/23 0322)   sodium chloride 0.9 % infusion ( Intravenous Rate/Dose Verify 12/28/23 0529)   famotidine (PEPCID) injection 20 mg (20 mg Intravenous $Given 12/28/23 0024)   iopamidol (ISOVUE-370) solution 90 mL (90 mLs Intravenous $Given 12/28/23 0058)   ketorolac (TORADOL) injection 15 mg (15 mg Intravenous $Given 12/28/23 0320)   HYDROmorphone (PF) (DILAUDID) injection 0.5 mg (0.5 mg Intravenous $Given 12/28/23 0550)       NEW PRESCRIPTIONS STARTED AT TODAY'S ED VISIT:  New Prescriptions    No medications on file       HPI     HPI     Heidy Crain is a 58 year old female with a pertinent history of abd surgeries  who presents to this ED via private car for evaluation of upper abd pains.    REVIEW OF SYSTEMS:  Review of Systems  remainder of systems are all otherwise negative.        Medical History     No past medical history on file.anxiety and depression based on chart review of office visit 08/01/23.  My chart review.    No past surgical history on file.states has had hiatal hernia repair, and gallbladder removed.    No family history on file.         Medication list reviewed from 9/12/123 office visit.    Physical Exam     First Vitals:  Patient Vitals for the past 24 hrs:   BP Temp Temp src Pulse Resp SpO2   12/28/23 0630 -- -- -- 75 -- 93 %   12/28/23 0600 -- -- -- 73 -- 92 %   12/28/23 0550 -- -- -- 72 -- 97 %   12/28/23 0546 (!) 170/92 -- -- 71 19 99 %   12/28/23 0010 -- 98.7  F (37.1  C) Oral -- -- --   12/28/23 0006 (!) 178/97 -- -- 89 18 97 %       PHYSICAL EXAM:   Constitutional:   in nad sitting  up in triage chair fully clothed   HENT:  Normocephalic, posterior pharynx wnl, mucous membranes moistt and dark pink   Eyes:  PERRL, EOMI, Conjunctiva normal, No discharge, no scleral icterus.  Respiratory:  Breathing easily, cta  Cardiovascular:  rrr nl s1s2 0 murmurs, rubs, or gallops.  Peripheral pulses dp, pt, and radial are wnl.  no peripheral edema   GI:  Bowel sounds normal, Soft, ttp throughout upper abd but worse on r than on left, No flank tenderness, nondistended.  :No CVA tenderness.   Musculoskeletal:  Moves all extremities.  No erythematous or swollen major joints,   Integument:  normal color on exposed skin.  Lymphatic:  No cervical lymphadenopathy  Neurologic:  Alert & oriented x 3, Normal motor function, Normal sensory function, No focal deficits noted. Normal speech.  Psychiatric:  Affect normal, Judgment normal, Mood normal.     Results     LAB AND RADIOLOGY:  All pertinent labs reviewed and interpreted  Results for orders placed or performed during the hospital encounter of 12/28/23   CT Abdomen Pelvis w Contrast     Status: None    Narrative    EXAM: CT ABDOMEN PELVIS W CONTRAST  LOCATION: [facility]  DATE: 12/28/2023    INDICATION: abd pain upper abd. bloating. previous hernia surgery. no bm x 3  COMPARISON: 03/10/2022  TECHNIQUE: CT scan of the abdomen and pelvis was performed following injection of IV contrast. Multiplanar reformats were obtained. Dose reduction techniques were used.  CONTRAST: Isovue 370 90ml    FINDINGS:   LOWER CHEST: Normal.    HEPATOBILIARY: Diffuse hepatic steatosis. No significant mass. No bile duct dilatation. Cholecystectomy.    PANCREAS: There is mild peripancreatic stranding extending into the central mesentery. No pancreatic duct dilation or mass.    SPLEEN: Normal.    ADRENAL GLANDS: Normal.    KIDNEYS/BLADDER: There is a 4 mm nonobstructing right lower pole renal calculus. No hydronephrosis. Bladder is unremarkable.    BOWEL: Mild  colonic diverticulosis. No obstruction or inflammatory change. Normal appendix.    LYMPH NODES: Normal.    VASCULATURE: Unremarkable.    PELVIC ORGANS: Normal. Trace pelvic free fluid.    MUSCULOSKELETAL: Small periumbilical fat-containing hernia.      Impression    IMPRESSION:   1.  Peripancreatic stranding suggestive of acute pancreatitis.  2.  Hepatic steatosis.  3.  Nonobstructing right nephrolithiasis.   US Abdomen Limited     Status: None    Narrative    EXAM: US ABDOMEN LIMITED  LOCATION: [facility]  DATE: 12/28/2023    INDICATION: Acute pancreatitis. Prior cholecystectomy. Evaluate for obstructing biliary stone.  COMPARISON: CT abdomen and pelvis with IV contrast 12/28/2023.  TECHNIQUE: Limited abdominal ultrasound.    FINDINGS:    GALLBLADDER: Surgically absent.    BILE DUCTS: No biliary dilatation. The common duct measures 6 mm. No choledocholithiasis.    LIVER: Echogenic hepatic parenchyma without discrete lesion. Smooth hepatic contour.     RIGHT KIDNEY: No stones, masses or hydronephrosis. Right kidney measures 11.6 cm 4 4.    PANCREAS: Partially visualized due to overlying bowel gas, grossly normal where seen. Changes of acute pancreatitis were better demonstrated on CT performed earlier.    No ascites.      Impression    IMPRESSION:  1.  Cholecystectomy. No biliary ectasia or choledocholithiasis. Echogenic hepatic parenchyma without discrete lesion.    2.  Pancreas partially visualized due to overlying bowel gas, grossly normal where seen. Changes of acute pancreatitis were better demonstrated on CT performed earlier.       Comprehensive metabolic panel     Status: Abnormal   Result Value Ref Range    Sodium 139 135 - 145 mmol/L    Potassium 4.1 3.4 - 5.3 mmol/L    Carbon Dioxide (CO2) 28 22 - 29 mmol/L    Anion Gap 9 7 - 15 mmol/L    Urea Nitrogen 8.1 8.0 - 23.0 mg/dL    Creatinine 0.55 0.51 - 0.95 mg/dL    GFR Estimate >90 >60 mL/min/1.73m2    Calcium 9.1 8.8 - 10.2 mg/dL     Chloride 102 98 - 107 mmol/L    Glucose 113 (H) 70 - 99 mg/dL    Alkaline Phosphatase 93 40 - 150 U/L    AST 21 0 - 45 U/L    ALT 38 0 - 50 U/L    Protein Total 7.0 6.4 - 8.3 g/dL    Albumin 4.2 3.5 - 5.2 g/dL    Bilirubin Total 0.4 <=1.2 mg/dL   Lipase     Status: Abnormal   Result Value Ref Range    Lipase >3,000 (H) 13 - 60 U/L   CBC with platelets and differential     Status: None   Result Value Ref Range    WBC Count 8.5 4.0 - 11.0 10e3/uL    RBC Count 4.31 3.80 - 5.20 10e6/uL    Hemoglobin 13.2 11.7 - 15.7 g/dL    Hematocrit 38.8 35.0 - 47.0 %    MCV 90 78 - 100 fL    MCH 30.6 26.5 - 33.0 pg    MCHC 34.0 31.5 - 36.5 g/dL    RDW 13.0 10.0 - 15.0 %    Platelet Count 201 150 - 450 10e3/uL    % Neutrophils 60 %    % Lymphocytes 28 %    % Monocytes 7 %    % Eosinophils 3 %    % Basophils 1 %    % Immature Granulocytes 1 %    NRBCs per 100 WBC 0 <1 /100    Absolute Neutrophils 5.1 1.6 - 8.3 10e3/uL    Absolute Lymphocytes 2.4 0.8 - 5.3 10e3/uL    Absolute Monocytes 0.6 0.0 - 1.3 10e3/uL    Absolute Eosinophils 0.3 0.0 - 0.7 10e3/uL    Absolute Basophils 0.0 0.0 - 0.2 10e3/uL    Absolute Immature Granulocytes 0.1 <=0.4 10e3/uL    Absolute NRBCs 0.0 10e3/uL   Alcohol level blood     Status: Normal   Result Value Ref Range    Alcohol ethyl <0.01 <=0.01 g/dL   CBC with platelets differential     Status: None    Narrative    The following orders were created for panel order CBC with platelets differential.  Procedure                               Abnormality         Status                     ---------                               -----------         ------                     CBC with platelets and d...[[id number]]                      Final result                 Please view results for these tests on the individual orders.       PROCEDURES:  Procedures:      [facility] Documentation     Medical Decision Making    History:  Supplemental history from: Documented in chart, if applicable  External Record(s)  reviewed: Documented in chart, if applicable. and Outpatient Record: 8/1/23, 9/7/23, 9/12/23, and 11/22/23 phone call    Work Up:  Chart documentation includes differential considered and any EKGs or imaging independently interpreted by provider, where specified.  In additional to work up documented, I considered the following work up: Documented in chart, if applicable.    External consultation:  Discussion of management with another provider: Documented in chart, if applicable    Complicating factors:  Care impacted by chronic illness: N/A  Care affected by social determinants of health: N/A    Disposition considerations:  transfer to [facility] as no beds available here.         Shanelle Carballo MD  Emergency Medicine  [facility] EMERGENCY ROOM         Shanelle Carballo MD  12/28/23 0794

## 2023-12-28 NOTE — PLAN OF CARE
[facility] ADMISSION NOTE    Patient admitted to room 2305 at approximately 0845 via ambulation from [facility]. Patient was accompanied by transport tech.     Verbal SBAR report received from Lore prior to patient arrival.     Patient ambulated to bed independently. Patient alert and oriented X 3. Pain is controlled with current analgesics.  Medication(s) being used: prescription NSAID's including Dilaudid, Oxycodone.  . Admission vital signs: Blood pressure (!) 171/99, pulse 76, temperature 98.1  F (36.7  C), temperature source Oral, resp. rate 18, SpO2 98%. Patient was oriented to plan of care, call light, bed controls, tv, telephone, bathroom, and visiting hours.     Risk Assessment    The following safety risks were identified during admission: none. Yellow risk band applied: NO.     Skin Initial Assessment    This writer admitted this patient and completed a full skin assessment and Teja score in the Adult PCS flowsheet. Appropriate interventions initiated as needed.     Secondary skin check completed by Emperatriz Lezama Risk Assessment  Sensory Perception: 4-->no impairment  Moisture: 4-->rarely moist  Activity: 3-->walks occasionally  Mobility: 4-->no limitation  Nutrition: 2-->probably inadequate  Friction and Shear: 2-->potential problem  Teja Score: 19  Nutrition Interventions: Maintain adequate hydration  Friction/Shear Interventions: HOB 30 degrees or less  Mattress: Standard gel/foam mattress (IsoFlex, Atmos Air, etc.)  Bed Frame: Standard width and length    Education    Patient has a [place] to Observation order: No  Observation education completed and documented: N/A      Ghanshyam Woodson RN

## 2023-12-28 NOTE — PROGRESS NOTES
Skin affirmation note     Admitting nurse completed full skin assessment, Teja score and Teja interventions. This writer agrees with the initial skin assessment findings.

## 2023-12-29 LAB
ALBUMIN SERPL BCG-MCNC: 3.6 G/DL (ref 3.5–5.2)
ALP SERPL-CCNC: 86 U/L (ref 40–150)
ALT SERPL W P-5'-P-CCNC: 29 U/L (ref 0–50)
ANION GAP SERPL CALCULATED.3IONS-SCNC: 12 MMOL/L (ref 7–15)
AST SERPL W P-5'-P-CCNC: 17 U/L (ref 0–45)
BASOPHILS # BLD AUTO: 0 10E3/UL (ref 0–0.2)
BASOPHILS NFR BLD AUTO: 0 %
BILIRUB SERPL-MCNC: 0.4 MG/DL
BUN SERPL-MCNC: 3.9 MG/DL (ref 8–23)
CALCIUM SERPL-MCNC: 8.8 MG/DL (ref 8.8–10.2)
CHLORIDE SERPL-SCNC: 99 MMOL/L (ref 98–107)
CREAT SERPL-MCNC: 0.55 MG/DL (ref 0.51–0.95)
DEPRECATED HCO3 PLAS-SCNC: 25 MMOL/L (ref 22–29)
EGFRCR SERPLBLD CKD-EPI 2021: >90 ML/MIN/1.73M2
EOSINOPHIL # BLD AUTO: 0.2 10E3/UL (ref 0–0.7)
EOSINOPHIL NFR BLD AUTO: 3 %
ERYTHROCYTE [DISTWIDTH] IN BLOOD BY AUTOMATED COUNT: 12.8 % (ref 10–15)
GLUCOSE SERPL-MCNC: 129 MG/DL (ref 70–99)
HCT VFR BLD AUTO: 33.4 % (ref 35–47)
HGB BLD-MCNC: 11.3 G/DL (ref 11.7–15.7)
IMM GRANULOCYTES # BLD: 0 10E3/UL
IMM GRANULOCYTES NFR BLD: 0 %
LYMPHOCYTES # BLD AUTO: 1.5 10E3/UL (ref 0.8–5.3)
LYMPHOCYTES NFR BLD AUTO: 21 %
MCH RBC QN AUTO: 30.9 PG (ref 26.5–33)
MCHC RBC AUTO-ENTMCNC: 33.8 G/DL (ref 31.5–36.5)
MCV RBC AUTO: 91 FL (ref 78–100)
MONOCYTES # BLD AUTO: 0.4 10E3/UL (ref 0–1.3)
MONOCYTES NFR BLD AUTO: 6 %
NEUTROPHILS # BLD AUTO: 4.9 10E3/UL (ref 1.6–8.3)
NEUTROPHILS NFR BLD AUTO: 70 %
NRBC # BLD AUTO: 0 10E3/UL
NRBC BLD AUTO-RTO: 0 /100
PLATELET # BLD AUTO: 185 10E3/UL (ref 150–450)
POTASSIUM SERPL-SCNC: 4 MMOL/L (ref 3.4–5.3)
PROT SERPL-MCNC: 6.6 G/DL (ref 6.4–8.3)
RBC # BLD AUTO: 3.66 10E6/UL (ref 3.8–5.2)
SODIUM SERPL-SCNC: 136 MMOL/L (ref 135–145)
WBC # BLD AUTO: 7 10E3/UL (ref 4–11)

## 2023-12-29 PROCEDURE — 258N000003 HC RX IP 258 OP 636: Performed by: PHYSICIAN ASSISTANT

## 2023-12-29 PROCEDURE — 120N000001 HC R&B MED SURG/OB

## 2023-12-29 PROCEDURE — 80053 COMPREHEN METABOLIC PANEL: CPT | Performed by: PHYSICIAN ASSISTANT

## 2023-12-29 PROCEDURE — 250N000011 HC RX IP 250 OP 636: Performed by: PHYSICIAN ASSISTANT

## 2023-12-29 PROCEDURE — 99232 SBSQ HOSP IP/OBS MODERATE 35: CPT | Performed by: INTERNAL MEDICINE

## 2023-12-29 PROCEDURE — 250N000013 HC RX MED GY IP 250 OP 250 PS 637: Performed by: PHYSICIAN ASSISTANT

## 2023-12-29 PROCEDURE — 36415 COLL VENOUS BLD VENIPUNCTURE: CPT | Performed by: PHYSICIAN ASSISTANT

## 2023-12-29 PROCEDURE — 85025 COMPLETE CBC W/AUTO DIFF WBC: CPT | Performed by: PHYSICIAN ASSISTANT

## 2023-12-29 PROCEDURE — 258N000003 HC RX IP 258 OP 636: Performed by: INTERNAL MEDICINE

## 2023-12-29 RX ORDER — HYDROMORPHONE HCL IN WATER/PF 6 MG/30 ML
0.4 PATIENT CONTROLLED ANALGESIA SYRINGE INTRAVENOUS EVERY 4 HOURS PRN
Status: DISCONTINUED | OUTPATIENT
Start: 2023-12-29 | End: 2023-12-31 | Stop reason: HOSPADM

## 2023-12-29 RX ADMIN — OXYCODONE HYDROCHLORIDE 5 MG: 5 TABLET ORAL at 08:26

## 2023-12-29 RX ADMIN — CYCLOSPORINE 1 DROP: 0.5 EMULSION OPHTHALMIC at 08:21

## 2023-12-29 RX ADMIN — OXYCODONE HYDROCHLORIDE 5 MG: 5 TABLET ORAL at 02:16

## 2023-12-29 RX ADMIN — OXYCODONE HYDROCHLORIDE 5 MG: 5 TABLET ORAL at 18:34

## 2023-12-29 RX ADMIN — CYCLOSPORINE 1 DROP: 0.5 EMULSION OPHTHALMIC at 20:48

## 2023-12-29 RX ADMIN — ONDANSETRON 4 MG: 4 TABLET, ORALLY DISINTEGRATING ORAL at 12:24

## 2023-12-29 RX ADMIN — ONDANSETRON 4 MG: 4 TABLET, ORALLY DISINTEGRATING ORAL at 02:17

## 2023-12-29 RX ADMIN — SODIUM CHLORIDE, POTASSIUM CHLORIDE, SODIUM LACTATE AND CALCIUM CHLORIDE: 600; 310; 30; 20 INJECTION, SOLUTION INTRAVENOUS at 12:35

## 2023-12-29 RX ADMIN — ACETAMINOPHEN 650 MG: 325 TABLET, FILM COATED ORAL at 12:24

## 2023-12-29 RX ADMIN — DULOXETINE HYDROCHLORIDE 60 MG: 30 CAPSULE, DELAYED RELEASE ORAL at 08:20

## 2023-12-29 RX ADMIN — SODIUM CHLORIDE, POTASSIUM CHLORIDE, SODIUM LACTATE AND CALCIUM CHLORIDE: 600; 310; 30; 20 INJECTION, SOLUTION INTRAVENOUS at 21:00

## 2023-12-29 RX ADMIN — ACETAMINOPHEN 650 MG: 325 TABLET, FILM COATED ORAL at 18:34

## 2023-12-29 RX ADMIN — SODIUM CHLORIDE, POTASSIUM CHLORIDE, SODIUM LACTATE AND CALCIUM CHLORIDE: 600; 310; 30; 20 INJECTION, SOLUTION INTRAVENOUS at 04:45

## 2023-12-29 ASSESSMENT — ACTIVITIES OF DAILY LIVING (ADL)
ADLS_ACUITY_SCORE: 20
DEPENDENT_IADLS:: INDEPENDENT
ADLS_ACUITY_SCORE: 20

## 2023-12-29 NOTE — DISCHARGE INSTRUCTIONS
"Brief History of Presenting Illness:   As per admit hx  Heidy Crain is a 58 year old female who presented to the emergency department for evaluation of abdominal pain.     Pain started on Merissa Venessa (5 days ago) and has been waxing and waning since onset.   Pain is located throughout the entire abdomen, flank, ribs, and into her sternum.   Pain is constant but waxes and wanes, rated 2/10 at best, 5-6/10 at worst, described as a burning sensation, but also sometimes like hunger pains as well as bruising in her lower abdomen.   She feels \"gassy\" but has not been burping or passing gas.   She was unable to sleep due to the pain, and became so uncomfortable that she could not find any position to be in without pain.   Pain is worse if she eats anything, also worse with deep inspiration.  She had some nausea that has since resolved, no vomiting or diarrhea.   Because her symptoms were not improving, she presented to the emergency department for evaluation and was found to have acute pancreatitis.     She denies any known prior history of pancreatitis in the past, but did have a somewhat similar pain once in the past (although it was found to be due to a viral GI illness).   She drinks on a daily basis - 1-2 martinis every evening while sitting and watching the news.   No prior history of withdrawal, has quit drinking for a week in the past without any problems.              Hospital Course:   Heidy Crain is a 58 year old female admitted on 12/28/2023. She has a past medical history significant for alcohol abuse /  dependence, untreated hypercholesterolemia, anxiety / depression / PTSD, dry eyes, prior diaphragmatic hernia s/p repair, cholecystitis s/p cholecystectomy, and obesity who presented to the [facility] emergency department for evaluation of abdominal pain and was found to have acute pancreatitis. No beds were available at [facility], so patient was directly admitted to [facility] for " "treatment.     Acute  alcoholic pancreatitis   Presented with 3 day history of abdominal pain, elevated lipase (>3000), and peripancreatic stranding on CT imaging. Patient admits to daily alcohol use (see below). Has  triglycerides slightly elevated (203), but not significant enough to cause pancreatitis.    Received 2L LR bolus, followed by LR @ 125 ml/hr.   Jacobo po. Pain significantly improved -jacobo full but not regular diet--still needs prn oxycodone  Will discharge on po oxycodone prn. 20 pills.      Alcohol abuse / dependence  Patient admits to daily drinking, usually has 1-2 a \"generous\" martinis every evening (about 2 shots of liquor per drink). No recent binge drinking. Denies any history of withdrawal or seizures, recently stopped drinking for a week without any symptoms. Has been prescribed naloxone in the past but not currently taking.   Admit blood alcohol level negative. Last drink was on 12/24. No evidence of withdrawal at time of admission.   Patient is motivated to quit, realizes this is a detriment to her health, and does not want to have pancreatitis again.  SW seen pt . Discussed CD Resources and Assessment.  Placed CD Resources in Discharge AVS.  Patient declined an Assessment.  Patient stated, \"I'm done drinking\".      Htn  Bp 150-180/100. No h/o HTN-not on meds. Could be form pain/ ETOH -but likely has essential HTN.  Started lisinopril at 5mg/day.  -180/90. F/up O and get meds adjusted.     Hyperlipidemia / hypertriglyceridemia   Lipid panel from Feb 2023 shows total cholesterol 319, triglycerides 365, LCL cholesterol 194, and VLDL 74. She has been reluctant to start a medication, wanted to try lifestyle modifications first.   Admission lipid panel still demonstrates hyperlipidemia / hypertriglyceridemia despite attempts at lifestyle modifications.    - needs to start a statin at discharge. Will let her f/up OP     Anxiety / Depression / PTSD   Pre-existing, patient has a lot of stress " being the caretaker of her aging parents and her son with diabilities. Managed prior to admission with duloxetine 60 mg daily.  Pt having severe anxiety-had to give xanax last night. Really helps her.  -will discharge on xanax 0.25mg tid prn-20 pills. Going to see PMD in 1 week.   - Continue duloxetine (low likelihood that this is the cause of pancreatitis)     Dry eyes  Patient has been seeing and allergist and optometrist for treatment of her chronic dry eye. Just finished a course of steroid eye drops but still on restasis.   Patient recently finished a course of doxycycline for an eye infection (although records for this are not available).   - Continue restasis          Procedures:   No procedures performed during this admission             Mental Health: All Ages  Assessment and Referrals, Individual and Group Therapy, Psychiatry, Intensive Outpatient Program, Partial Hospitalization Program, Dual Diagnosis Program, and 55+ Seniors Program.     Substance Use Disorder: Adult & Adolescent  Assessment and Referrals, Outpatient ENMANUEL Programs (Day & Evening Groups), Dual Diagnosis, Intensive Outpatient Programs with [facility], Residential Treatment, Addiction Medicine, Detox, and Medication Assisted Treatment (MAT) Services.     Problem Gambling Treatment  Assessment and Referrals, Individual & Group Therapy, Rule 82, and Court Ordered.     Several Locations PLUS Telephone or Video Visits Offered.       Community Resources  [employer]'s website is:  [email or web address]  [facility]'s website is:  [email or web address]    [facility]'s website is:  https://www.smartrecovery.org    Alcoholics Anonymous website is:  http://www.aa.org  Community Drug & Alcohol Support Resources   Alcoholics Anonymous   24/7 Phone Line: 234.207.1686   https://[place]am[place].org/   For additional list of online meetings: http://aa-intergroup.org     Narcotics Anonymous [facility]  24 Hour Helpline:  "7-317-462-2241   [email or web address].org/   For online meetings, click \"Find a Meeting\" on homepage    [facility]  [address] [address]   Phone: [phone]  http://[place]Alumnize   "

## 2023-12-29 NOTE — PLAN OF CARE
"  Problem: Pancreatitis  Goal: Optimal Pain Control and Function  Intervention: Monitor and Manage Pain  Recent Flowsheet Documentation  Taken 12/29/2023 0527 by Julia Lucas RN  Pain Management Interventions:   medication (see MAR)   distraction       Goal Outcome Evaluation: Pt alert and oriented x4. Pt states \"this was a big wake up call for me, I know I won't do this again\". Pt having headache and abdominal pain. PRN oxycodone 5mg and Zofran 4mg given. BP running slightly high up to 174/92 then rechecked 15 min later at 168/82. Pt given ice pack for base of neck. Pt state some relief and feels it's been helpful. Pt slept remainder of shift.                         "

## 2023-12-29 NOTE — PROGRESS NOTES
SPIRITUAL HEALTH SERVICES  SPIRITUAL ASSESSMENT Progress Note  [facility] - Med Surg    Referral Source: Patient Request    I shared a reflective conversation with Heidy which incorporated elements of illness and family narrative along with spiritual history.    - Heidy welcomed visit and said that she could use prayers.  She shared about certain aspects of her life, particularly that she had moved back from [place] with her 31 year old son who has developmental disabilities.  She is living with her mother and father and described various challenges related to that situation.  Primarily her father likes his 5 pm TV show and having a martini as he watches.  Heidy stated that she had let herself start to settle in to that path, which is not good for her.    - She stated that she spoke with SW and did not want a formal assessment.  She shared a history of dealing with corrupt court systems and having no interest in trusting herself or her family to the decisions of others.      - She spoke of her lulu and questions about things she reads in the Bible.  I offered a prayer as we concluded our visit.  Heidy said that even though she doesn't agree with some of the things that pastors are saying she does come away with some good insights and would like to make her spiritual health a focus for the new year.    Plan:  will monitor patient's ongoing need for support during LOS.      Dl Pena M.A., [facility]  Staff Chaplain [facility]  Office: [phone]  Pager [phone]

## 2023-12-29 NOTE — CONSULTS
Care Management Initial Consult    General Information  Assessment completed with: Heidy Crisostomo  Type of CM/SW Visit: Offer D/C Planning    Primary Care Provider verified and updated as needed: Yes   Readmission within the last 30 days: no previous admission in last 30 days         Advance Care Planning: Advance Care Planning Reviewed: no concerns identified          Communication Assessment  Patient's communication style: spoken language (English or Bilingual)    Hearing Difficulty or Deaf: no   Wear Glasses or Blind: yes    Cognitive  Cognitive/Neuro/Behavioral: WDL                      Living Environment:   People in home: child(sarita), adult, parent(s)     Current living Arrangements:        Able to return to prior arrangements: yes       Family/Social Support:  Care provided by: self  Provides care for: parent(s), child(sarita)     Parent(s), Sibling(s)          Description of Support System: Supportive, Involved    Support Assessment: Lacks adequate emotional support, Caregiver experiencing high stress, Limited social contact and support    Current Resources:   Patient receiving home care services: No  Community Resources: None  Equipment currently used at home: none  Supplies currently used at home: None    Employment/Financial:  Employment Status: unemployed        Financial Concerns: none           Does the patient's insurance plan have a 3 day qualifying hospital stay waiver?  No    Lifestyle & Psychosocial Needs:  Social Determinants of Health     Food Insecurity: Not on file   Depression: Not on file   Housing Stability: Not on file   Tobacco Use: Not on file   Financial Resource Strain: Not on file   Alcohol Use: Not on file   Transportation Needs: Not on file   Physical Activity: Not on file   Interpersonal Safety: Not on file   Stress: Not on file   Social Connections: Not on file       Functional Status:  Prior to admission patient needed assistance:   Dependent ADLs:: Independent, Ambulation-no  "assistive device  Dependent IADLs:: Independent       Mental Health Status:  Mental Health Status: No Current Concerns       Chemical Dependency Status:  Chemical Dependency Status: Current Concern (See Note below)             Values/Beliefs:  Spiritual, Cultural Beliefs, [employer] Practices, Values that affect care: no               Additional Information:    Met with the Patient for CD Resources.  Per the discussion in Interdisciplinary Rounds, Patient may benefit from CD Resources/Assessment.    The Patient moved from [place] 2 years ago with her 31 year old son, Wenceslao to take care of her elderly parents.  Wenceslao has Aspergers, ADHD and DM.  Wenceslao is unable to work.  She had no drinking issues in [place].    Patient reports she drinks 1-2 martinis every evening.  Her 87 year old father is an alcoholic and has short term memory issues.  Her 84 year old mother is active and has no alcohol issues.  Her mother and siblings are supportive.    Discussed CD Resources and Assessment.  Placed CD Resources in Discharge AVS.  Patient declined an Assessment.  Patient stated, \"I'm done drinking\".      Lesley Garcia RN      "

## 2023-12-29 NOTE — PROGRESS NOTES
"[facility]    Hospital Medicine Progress Note  Date of Service (when I saw the patient): 12/29/2023    REASON FOR ADMISSION / INTERVAL HISTORY:  Heidy Crain is a 58 year old female admitted on 12/28/2023  for evaluation of abdominal pain   Pain is better today. See details below.      Assessment/ Plan     Heidy Crain is a 58 year old female admitted on 12/28/2023. She has a past medical history significant for alcohol abuse /  dependence, untreated hypercholesterolemia, anxiety / depression / PTSD, dry eyes, prior diaphragmatic hernia s/p repair, cholecystitis s/p cholecystectomy, and obesity who presented to the [facility] emergency department for evaluation of abdominal pain and was found to have acute pancreatitis. No beds were available at [facility], so patient was directly admitted to [facility] for treatment.     Acute  alcoholic pancreatitis   Presented with 3 day history of abdominal pain, elevated lipase (>3000), and peripancreatic stranding on CT imaging. Patient admits to daily alcohol use (see below). Has  triglycerides slightly elevated (203), but not significant enough to cause pancreatitis.    Received 2L LR bolus, followed by LR @ 125 ml/hr. Doing better-pain improved  -start po as jacobo. Continue iv fluids at 100cc/hr     Alcohol abuse / dependence  Patient admits to daily drinking, usually has 1-2 a \"generous\" martinis every evening (about 2 shots of liquor per drink). No recent binge drinking. Denies any history of withdrawal or seizures, recently stopped drinking for a week without any symptoms. Has been prescribed naloxone in the past but not currently taking.   Admit blood alcohol level negative. Last drink was on 12/24. No evidence of withdrawal at time of admission.   - No CIWA needed at this time, but monitor for evidence of withdrawal and initiate as needed  - Patient is motivated to quit, realizes this is a detriment to her health, and does " not want to have pancreatitis again  -will have SW see for resource/ education    Htn  Bp 150-160/90. No h/o HTN-not on meds. Could be form pain/ ETOH   -watch. Needs to f/up OP and consider meds if needed     Hyperlipidemia / hypertriglyceridemia   Lipid panel from Feb 2023 shows total cholesterol 319, triglycerides 365, LCL cholesterol 194, and VLDL 74. She has been reluctant to start a medication, wanted to try lifestyle modifications first.   Admission lipid panel still demonstrates hyperlipidemia / hypertriglyceridemia despite attempts at lifestyle modifications.    - needs to start a statin at discharge. Will discuss with pt.     Anxiety / Depression / PTSD   Pre-existing, patient has a lot of stress being the caretaker of her aging parents and her son with diabilities. Managed prior to admission with duloxetine 60 mg daily.  - Continue duloxetine (low likelihood that this is the cause of pancreatitis)     Dry eyes  Patient has been seeing and allergist and optometrist for treatment of her chronic dry eye. Just finished a course of steroid eye drops but still on restasis.   Patient recently finished a course of doxycycline for an eye infection (although records for this are not available).   - Continue restasis          Diet: Clear Liquid Diet    DVT Prophylaxis: Low Risk/Ambulatory with no VTE prophylaxis indicated  Ward Catheter: Not present  Code Status: Full Code        BEULAH BAPTISTE MD   Pg [phone]        ROS:  As described in A/P and Exam.  Otherwise ALL are  negative.    PHYSICAL EXAM:  All vitals have been reviewed    Blood pressure (!) 160/95, pulse 80, temperature 98.7  F (37.1  C), temperature source Oral, resp. rate 18, weight 105.5 kg (232 lb 9.4 oz), SpO2 95%.    No intake/output data recorded.    GENERAL APPEARANCE: healthy, alert and no distress  EYES: conjunctiva clear, eyes grossly normal  HENT: external ears and nose normal   RESP: lungs clear to auscultation - no rales, rhonchi or  wheezes  CV: regular rate and rhythm, normal S1 S2, no S3 or S4 and no murmur, click or rub   ABDOMEN: soft, mild tenderness epigastrium, no HSM or masses and bowel sounds normal  MS: no clubbing, cyanosis; no edema  SKIN: clear without significant rashes or lesions  NEURO: -non-focal moves all 4 extr    ROUTINE  LABS (Last four results)  CMP  Recent Labs   Lab 12/29/23 0449 12/28/23  0019    139   POTASSIUM 4.0 4.1   CHLORIDE 99 102   CO2 25 28   ANIONGAP 12 9   * 113*   BUN 3.9* 8.1   CR 0.55 0.55   GFRESTIMATED >90 >90   SARAH 8.8 9.1   PROTTOTAL 6.6 7.0   ALBUMIN 3.6 4.2   BILITOTAL 0.4 0.4   ALKPHOS 86 93   AST 17 21   ALT 29 38     CBC  Recent Labs   Lab 12/29/23 0449 12/28/23  0019   WBC 7.0 8.5   RBC 3.66* 4.31   HGB 11.3* 13.2   HCT 33.4* 38.8   MCV 91 90   MCH 30.9 30.6   MCHC 33.8 34.0   RDW 12.8 13.0    201     INRNo lab results found in last 7 days.  Arterial Blood GasNo lab results found in last 7 days.    No results found for this or any previous visit (from the past 24 hour(s)).

## 2023-12-30 PROCEDURE — 99232 SBSQ HOSP IP/OBS MODERATE 35: CPT | Performed by: INTERNAL MEDICINE

## 2023-12-30 PROCEDURE — 120N000001 HC R&B MED SURG/OB

## 2023-12-30 PROCEDURE — 258N000003 HC RX IP 258 OP 636: Performed by: INTERNAL MEDICINE

## 2023-12-30 PROCEDURE — 250N000013 HC RX MED GY IP 250 OP 250 PS 637: Performed by: PHYSICIAN ASSISTANT

## 2023-12-30 PROCEDURE — 250N000013 HC RX MED GY IP 250 OP 250 PS 637: Performed by: INTERNAL MEDICINE

## 2023-12-30 PROCEDURE — 250N000011 HC RX IP 250 OP 636: Performed by: PHYSICIAN ASSISTANT

## 2023-12-30 RX ORDER — LISINOPRIL 10 MG/1
10 TABLET ORAL DAILY
Status: DISCONTINUED | OUTPATIENT
Start: 2023-12-30 | End: 2023-12-30

## 2023-12-30 RX ORDER — ALPRAZOLAM 0.25 MG
0.25 TABLET ORAL 3 TIMES DAILY PRN
Status: DISCONTINUED | OUTPATIENT
Start: 2023-12-30 | End: 2023-12-31 | Stop reason: HOSPADM

## 2023-12-30 RX ORDER — LISINOPRIL 5 MG/1
5 TABLET ORAL DAILY
Status: DISCONTINUED | OUTPATIENT
Start: 2023-12-31 | End: 2023-12-30

## 2023-12-30 RX ORDER — LISINOPRIL 5 MG/1
5 TABLET ORAL DAILY
Status: DISCONTINUED | OUTPATIENT
Start: 2023-12-30 | End: 2023-12-31 | Stop reason: HOSPADM

## 2023-12-30 RX ADMIN — CYCLOSPORINE 1 DROP: 0.5 EMULSION OPHTHALMIC at 07:52

## 2023-12-30 RX ADMIN — ACETAMINOPHEN 650 MG: 325 TABLET, FILM COATED ORAL at 11:47

## 2023-12-30 RX ADMIN — OXYCODONE HYDROCHLORIDE 5 MG: 5 TABLET ORAL at 22:19

## 2023-12-30 RX ADMIN — ACETAMINOPHEN 650 MG: 325 TABLET, FILM COATED ORAL at 18:20

## 2023-12-30 RX ADMIN — OXYCODONE HYDROCHLORIDE 2.5 MG: 5 TABLET ORAL at 01:33

## 2023-12-30 RX ADMIN — ACETAMINOPHEN 650 MG: 325 TABLET, FILM COATED ORAL at 07:04

## 2023-12-30 RX ADMIN — ONDANSETRON 4 MG: 4 TABLET, ORALLY DISINTEGRATING ORAL at 07:04

## 2023-12-30 RX ADMIN — OXYCODONE HYDROCHLORIDE 5 MG: 5 TABLET ORAL at 18:20

## 2023-12-30 RX ADMIN — ACETAMINOPHEN 650 MG: 325 TABLET, FILM COATED ORAL at 01:33

## 2023-12-30 RX ADMIN — LISINOPRIL 5 MG: 5 TABLET ORAL at 09:06

## 2023-12-30 RX ADMIN — DULOXETINE HYDROCHLORIDE 60 MG: 30 CAPSULE, DELAYED RELEASE ORAL at 07:52

## 2023-12-30 RX ADMIN — SODIUM CHLORIDE, POTASSIUM CHLORIDE, SODIUM LACTATE AND CALCIUM CHLORIDE: 600; 310; 30; 20 INJECTION, SOLUTION INTRAVENOUS at 21:03

## 2023-12-30 RX ADMIN — SODIUM CHLORIDE, POTASSIUM CHLORIDE, SODIUM LACTATE AND CALCIUM CHLORIDE: 600; 310; 30; 20 INJECTION, SOLUTION INTRAVENOUS at 06:59

## 2023-12-30 RX ADMIN — ACETAMINOPHEN 650 MG: 325 TABLET, FILM COATED ORAL at 22:18

## 2023-12-30 RX ADMIN — OXYCODONE HYDROCHLORIDE 5 MG: 5 TABLET ORAL at 11:47

## 2023-12-30 RX ADMIN — CYCLOSPORINE 1 DROP: 0.5 EMULSION OPHTHALMIC at 21:05

## 2023-12-30 ASSESSMENT — ACTIVITIES OF DAILY LIVING (ADL)
ADLS_ACUITY_SCORE: 22
ADLS_ACUITY_SCORE: 20
ADLS_ACUITY_SCORE: 22
ADLS_ACUITY_SCORE: 20
ADLS_ACUITY_SCORE: 20
ADLS_ACUITY_SCORE: 22

## 2023-12-30 NOTE — PLAN OF CARE
Problem: Adult Inpatient Plan of Care  Goal: Optimal Comfort and Wellbeing  Outcome: Progressing  Intervention: Monitor Pain and Promote Comfort  Recent Flowsheet Documentation  Taken 12/29/2023 1309 by Tea Boyd RN  Pain Management Interventions: medication (see MAR)   Goal Outcome Evaluation:       Advanced diet to regular. Patient tolerated supper but did have some increased pain. No complaints of nausea this evening. Headache has remained all day. Pain is managed with PRN tylenol and 5 mg oxycodone. Ice pack applied to head. Patient is passing gas and had small BM this evening.

## 2023-12-30 NOTE — PROGRESS NOTES
"[facility] Medicine Progress Note  Date of Service (when I saw the patient): 12/30/2023    REASON FOR ADMISSION / INTERVAL HISTORY:  Heidy Crain is a 58 year old female admitted on 12/28/2023  for evaluation of abdominal pain   Pain is better. Jason po regular diet  See details below.      Assessment/ Plan     Heidy Crain is a 58 year old female admitted on 12/28/2023. She has a past medical history significant for alcohol abuse /  dependence, untreated hypercholesterolemia, anxiety / depression / PTSD, dry eyes, prior diaphragmatic hernia s/p repair, cholecystitis s/p cholecystectomy, and obesity who presented to the [facility] emergency department for evaluation of abdominal pain and was found to have acute pancreatitis. No beds were available at [facility], so patient was directly admitted to [facility] for treatment.     Acute  alcoholic pancreatitis   Presented with 3 day history of abdominal pain, elevated lipase (>3000), and peripancreatic stranding on CT imaging. Patient admits to daily alcohol use (see below). Has  triglycerides slightly elevated (203), but not significant enough to cause pancreatitis.    Received 2L LR bolus, followed by LR @ 125 ml/hr. Doing better-pain improved  Jason po. Decrease fluids to 50cc/hr.     Alcohol abuse / dependence  Patient admits to daily drinking, usually has 1-2 a \"generous\" martinis every evening (about 2 shots of liquor per drink). No recent binge drinking. Denies any history of withdrawal or seizures, recently stopped drinking for a week without any symptoms. Has been prescribed naloxone in the past but not currently taking.   Admit blood alcohol level negative. Last drink was on 12/24. No evidence of withdrawal at time of admission.   - No CIWA needed at this time, but monitor for evidence of withdrawal and initiate as needed  - Patient is motivated to quit, realizes this is a detriment to her health, and " does not want to have pancreatitis again  -will have SW see for resource/ education    Htn  Bp 150-180/100. No h/o HTN-not on meds. Could be form pain/ ETOH -but likely has essential HTN.  Will start lisinopril at 5mg/day. F/up OP.     Hyperlipidemia / hypertriglyceridemia   Lipid panel from Feb 2023 shows total cholesterol 319, triglycerides 365, LCL cholesterol 194, and VLDL 74. She has been reluctant to start a medication, wanted to try lifestyle modifications first.   Admission lipid panel still demonstrates hyperlipidemia / hypertriglyceridemia despite attempts at lifestyle modifications.    - needs to start a statin at discharge. Will let her f/up OP     Anxiety / Depression / PTSD   Pre-existing, patient has a lot of stress being the caretaker of her aging parents and her son with diabilities. Managed prior to admission with duloxetine 60 mg daily.  - Continue duloxetine (low likelihood that this is the cause of pancreatitis)     Dry eyes  Patient has been seeing and allergist and optometrist for treatment of her chronic dry eye. Just finished a course of steroid eye drops but still on restasis.   Patient recently finished a course of doxycycline for an eye infection (although records for this are not available).   - Continue restasis          Diet: Advance Diet as Tolerated: Regular Diet Adult    DVT Prophylaxis: Low Risk/Ambulatory with no VTE prophylaxis indicated  Ward Catheter: Not present  Code Status: Full Code        BEULAH BAPTISTE MD   Pg [phone]        ROS:  As described in A/P and Exam.  Otherwise ALL are  negative.    PHYSICAL EXAM:  All vitals have been reviewed    Blood pressure (!) 156/88, pulse 84, temperature 97.7  F (36.5  C), temperature source Oral, resp. rate 18, weight 104.3 kg (229 lb 15 oz), SpO2 96%.    No intake/output data recorded.    GENERAL APPEARANCE: healthy, alert and no distress  EYES: conjunctiva clear, eyes grossly normal  HENT: external ears and nose normal   RESP:  lungs clear to auscultation - no rales, rhonchi or wheezes  CV: regular rate and rhythm, normal S1 S2, no S3 or S4 and no murmur, click or rub   ABDOMEN: soft, non-tender, no HSM or masses and bowel sounds normal  MS: no clubbing, cyanosis; no edema  SKIN: clear without significant rashes or lesions  NEURO: -non-focal moves all 4 extr    ROUTINE  LABS (Last four results)  CMP  Recent Labs   Lab 12/29/23 0449 12/28/23  0019    139   POTASSIUM 4.0 4.1   CHLORIDE 99 102   CO2 25 28   ANIONGAP 12 9   * 113*   BUN 3.9* 8.1   CR 0.55 0.55   GFRESTIMATED >90 >90   SARAH 8.8 9.1   PROTTOTAL 6.6 7.0   ALBUMIN 3.6 4.2   BILITOTAL 0.4 0.4   ALKPHOS 86 93   AST 17 21   ALT 29 38     CBC  Recent Labs   Lab 12/29/23 0449 12/28/23 0019   WBC 7.0 8.5   RBC 3.66* 4.31   HGB 11.3* 13.2   HCT 33.4* 38.8   MCV 91 90   MCH 30.9 30.6   MCHC 33.8 34.0   RDW 12.8 13.0    201     INRNo lab results found in last 7 days.  Arterial Blood GasNo lab results found in last 7 days.    No results found for this or any previous visit (from the past 24 hour(s)).

## 2023-12-30 NOTE — PLAN OF CARE
Problem: Adult Inpatient Plan of Care  Goal: Optimal Comfort and Wellbeing  Intervention: Monitor Pain and Promote Comfort  Recent Flowsheet Documentation  Taken 12/30/2023 0218 by Savannah Turcios RN  Pain Management Interventions: medication (see MAR)  Taken 12/29/2023 1919 by Savannah Turcios RN  Pain Management Interventions: medication (see MAR)     Problem: Pain Acute  Goal: Optimal Pain Control and Function  Intervention: Develop Pain Management Plan  Recent Flowsheet Documentation  Taken 12/30/2023 0218 by Savannah Turcios RN  Pain Management Interventions: medication (see MAR)  Taken 12/29/2023 1919 by Savannah Turcios RN  Pain Management Interventions: medication (see MAR)     Problem: Pain Acute  Goal: Optimal Pain Control and Function  Intervention: Prevent or Manage Pain  Recent Flowsheet Documentation  Taken 12/30/2023 0415 by Savannah Turcios RN  Bowel Elimination Promotion: adequate fluid intake promoted       Goal Outcome Evaluation:    Alert and oriented x4. Makes needs known. Independent with SBA to bathroom. Pt reports waking up to use bathroom every 2 hours. C/o headache and neck pain. PRN tylenol and oxycodone given with relief. Ice pack offered with relief. Slept through the shift. Patient's SBP > 185 all noc, asymptomatic, MD notified and will address in AM.Will continue to monitor.     Savannah Turcios RN

## 2023-12-30 NOTE — PLAN OF CARE
Goal Outcome Evaluation:           Overall Patient Progress: improvingOverall Patient Progress: improving       Problem: Adult Inpatient Plan of Care  Goal: Optimal Comfort and Wellbeing  Outcome: Progressing  Intervention: Monitor Pain and Promote Comfort  Recent Flowsheet Documentation  Taken 12/30/2023 1232 by Tea Boyd, RN  Pain Management Interventions:   medication (see MAR)   cold applied  Taken 12/30/2023 0749 by Tea Boyd, RN  Pain Management Interventions:   medication (see MAR)   cold applied     BP improved with 5 mg Lisinopril. Headache and abdominal pain managed with PRN tylenol and 5 mg oxycodone. Patient was able to rest this afternoon.   Patient is eating but cautious.  IV fluids infusing at 50 mL/hr.

## 2023-12-31 VITALS
OXYGEN SATURATION: 98 % | WEIGHT: 228.18 LBS | TEMPERATURE: 97.9 F | HEART RATE: 76 BPM | DIASTOLIC BLOOD PRESSURE: 93 MMHG | SYSTOLIC BLOOD PRESSURE: 164 MMHG | RESPIRATION RATE: 18 BRPM | BODY MASS INDEX: 37.97 KG/M2

## 2023-12-31 PROCEDURE — 99239 HOSP IP/OBS DSCHRG MGMT >30: CPT | Performed by: INTERNAL MEDICINE

## 2023-12-31 PROCEDURE — 250N000013 HC RX MED GY IP 250 OP 250 PS 637: Performed by: PHYSICIAN ASSISTANT

## 2023-12-31 PROCEDURE — 250N000013 HC RX MED GY IP 250 OP 250 PS 637: Performed by: INTERNAL MEDICINE

## 2023-12-31 RX ORDER — ALPRAZOLAM 0.25 MG
0.25 TABLET ORAL 3 TIMES DAILY PRN
Qty: 20 TABLET | Refills: 0 | Status: SHIPPED | OUTPATIENT
Start: 2023-12-31

## 2023-12-31 RX ORDER — OXYCODONE HYDROCHLORIDE 5 MG/1
5 TABLET ORAL EVERY 4 HOURS PRN
Qty: 20 TABLET | Refills: 0 | Status: ON HOLD | OUTPATIENT
Start: 2023-12-31 | End: 2024-08-03

## 2023-12-31 RX ORDER — LISINOPRIL 5 MG/1
5 TABLET ORAL DAILY
Qty: 30 TABLET | Refills: 0 | Status: ON HOLD | OUTPATIENT
Start: 2024-01-01 | End: 2024-08-03

## 2023-12-31 RX ADMIN — ALPRAZOLAM 0.25 MG: 0.25 TABLET ORAL at 04:33

## 2023-12-31 RX ADMIN — ACETAMINOPHEN 650 MG: 325 TABLET, FILM COATED ORAL at 04:34

## 2023-12-31 RX ADMIN — LISINOPRIL 5 MG: 5 TABLET ORAL at 08:29

## 2023-12-31 RX ADMIN — CYCLOSPORINE 1 DROP: 0.5 EMULSION OPHTHALMIC at 08:29

## 2023-12-31 RX ADMIN — ACETAMINOPHEN 650 MG: 325 TABLET, FILM COATED ORAL at 12:02

## 2023-12-31 RX ADMIN — OXYCODONE HYDROCHLORIDE 5 MG: 5 TABLET ORAL at 12:02

## 2023-12-31 RX ADMIN — DULOXETINE HYDROCHLORIDE 60 MG: 30 CAPSULE, DELAYED RELEASE ORAL at 08:29

## 2023-12-31 RX ADMIN — OXYCODONE HYDROCHLORIDE 5 MG: 5 TABLET ORAL at 04:35

## 2023-12-31 ASSESSMENT — ACTIVITIES OF DAILY LIVING (ADL)
ADLS_ACUITY_SCORE: 22

## 2023-12-31 NOTE — DISCHARGE SUMMARY
"[facility] Discharge Summary    Hiedy Crain MRN# [id number]   Age: 58 year old YOB: 1965     Date of Admission:  12/28/2023  Date of Discharge::  12/31/2023  Admitting Physician:  Chad Sy DO  Discharge Physician:  Jesus Manuel Weiss MD  Primary Physician: Jason Mojica  Transferring Facility: N/A     Home clinic: unknown          Admission Diagnoses:   Alcoholic pancreatitis [K85.20]          Discharge Diagnosis:     Principle diagnosis: Acute  alcoholic pancreatitis   Secondary diagnoses:  Patient Active Problem List   Diagnosis    Alcoholic pancreatitis    Anxiety state    Depressive disorder    Calculus of gallbladder with chronic cholecystitis without obstruction    Depression, major, single episode, mild (H24)    Diaphragmatic hernia    Obesity, Class I, BMI 30-34.9    PTSD (post-traumatic stress disorder)    Alcohol abuse    Dry eyes          Brief History of Presenting Illness:   As per admit hx  Heidy Crain is a 58 year old female who presented to the emergency department for evaluation of abdominal pain.     Pain started on [place] Venessa (5 days ago) and has been waxing and waning since onset.   Pain is located throughout the entire abdomen, flank, ribs, and into her sternum.   Pain is constant but waxes and wanes, rated 2/10 at best, 5-6/10 at worst, described as a burning sensation, but also sometimes like hunger pains as well as bruising in her lower abdomen.   She feels \"gassy\" but has not been burping or passing gas.   She was unable to sleep due to the pain, and became so uncomfortable that she could not find any position to be in without pain.   Pain is worse if she eats anything, also worse with deep inspiration.  She had some nausea that has since resolved, no vomiting or diarrhea.   Because her symptoms were not improving, she presented to the emergency department for evaluation and was found to have acute pancreatitis.     She denies any known prior history " "of pancreatitis in the past, but did have a somewhat similar pain once in the past (although it was found to be due to a viral GI illness).   She drinks on a daily basis - 1-2 martinis every evening while sitting and watching the news.   No prior history of withdrawal, has quit drinking for a week in the past without any problems.             Hospital Course:   Heidy Crain is a 58 year old female admitted on 12/28/2023. She has a past medical history significant for alcohol abuse /  dependence, untreated hypercholesterolemia, anxiety / depression / PTSD, dry eyes, prior diaphragmatic hernia s/p repair, cholecystitis s/p cholecystectomy, and obesity who presented to the [facility] emergency department for evaluation of abdominal pain and was found to have acute pancreatitis. No beds were available at [facility], so patient was directly admitted to [facility] for treatment.     Acute  alcoholic pancreatitis   Presented with 3 day history of abdominal pain, elevated lipase (>3000), and peripancreatic stranding on CT imaging. Patient admits to daily alcohol use (see below). Has  triglycerides slightly elevated (203), but not significant enough to cause pancreatitis.    Received 2L LR bolus, followed by LR @ 125 ml/hr.   Jacobo po. Pain significantly improved -jacobo full but not regular diet--still needs prn oxycodone  Will discharge on po oxycodone prn. 20 pills.      Alcohol abuse / dependence  Patient admits to daily drinking, usually has 1-2 a \"generous\" martinis every evening (about 2 shots of liquor per drink). No recent binge drinking. Denies any history of withdrawal or seizures, recently stopped drinking for a week without any symptoms. Has been prescribed naloxone in the past but not currently taking.   Admit blood alcohol level negative. Last drink was on 12/24. No evidence of withdrawal at time of admission.   Patient is motivated to quit, realizes this is a detriment to her health, and does not " "want to have pancreatitis again.  SW seen pt . Discussed CD Resources and Assessment.  Placed CD Resources in Discharge AVS.  Patient declined an Assessment.  Patient stated, \"I'm done drinking\".      Htn  Bp 150-180/100. No h/o HTN-not on meds. Could be form pain/ ETOH -but likely has essential HTN.  Started lisinopril at 5mg/day.  -180/90. F/up O and get meds adjusted.     Hyperlipidemia / hypertriglyceridemia   Lipid panel from Feb 2023 shows total cholesterol 319, triglycerides 365, LCL cholesterol 194, and VLDL 74. She has been reluctant to start a medication, wanted to try lifestyle modifications first.   Admission lipid panel still demonstrates hyperlipidemia / hypertriglyceridemia despite attempts at lifestyle modifications.    - needs to start a statin at discharge. Will let her f/up OP     Anxiety / Depression / PTSD   Pre-existing, patient has a lot of stress being the caretaker of her aging parents and her son with diabilities. Managed prior to admission with duloxetine 60 mg daily.  Pt having severe anxiety-had to give xanax last night. Really helps her.  -will discharge on xanax 0.25mg tid prn-20 pills. Going to see PMD in 1 week.   - Continue duloxetine (low likelihood that this is the cause of pancreatitis)     Dry eyes  Patient has been seeing and allergist and optometrist for treatment of her chronic dry eye. Just finished a course of steroid eye drops but still on restasis.   Patient recently finished a course of doxycycline for an eye infection (although records for this are not available).   - Continue restasis          Procedures:   No procedures performed during this admission         Allergies:      Allergies   Allergen Reactions    Atorvastatin Headache     Other Reaction(s): Other - comment required    headache    Escitalopram Rash             Medications Prior to Admission:     Medications Prior to Admission   Medication Sig Dispense Refill Last Dose    DULoxetine (CYMBALTA) 60 MG " capsule Take 60 mg by mouth daily   12/27/2023    Fish Oil-Cholecalciferol (OMEGA-3 FISH OIL/VITAMIN D3) 5364-8817 MG-UNIT CAPS Take 1 capsule by mouth daily   12/27/2023 at am    RESTASIS 0.05 % ophthalmic emulsion Place 1 drop into both eyes 2 times daily   12/27/2023 at am    sennosides (SENOKOT) 8.6 MG tablet Take 1 tablet by mouth at bedtime   12/27/2023    tacrolimus (PROTOPIC) 0.1 % external ointment Apply topically 2 times daily as needed   Unknown at prn    triamcinolone (KENALOG) 0.1 % external ointment Apply topically 2 times daily as needed APPLY TOPICALLY TO THE CHEST AND NECK TWICE DAILY AS NEEDED   Unknown at prn    vitamin (B COMPLEX) capsule Take 1 tablet by mouth daily   12/27/2023 at am    vitamin B complex with vitamin C (STRESS TAB) tablet Take 1 tablet by mouth daily   12/27/2023 at am    vitamin C (ASCORBIC ACID) 500 MG tablet Take 1 tablet by mouth 2 times daily   12/27/2023 at am    vitamin D3 (CHOLECALCIFEROL) 50 mcg (2000 units) tablet Take 1 tablet by mouth daily   12/27/2023 at am    Vitamin E 45 MG (100 UNIT) CAPS Take 1 tablet by mouth daily   12/27/2023             Discharge Medications:     Current Discharge Medication List        START taking these medications    Details   lisinopril (ZESTRIL) 5 MG tablet Take 1 tablet (5 mg) by mouth daily  Qty: 30 tablet, Refills: 0    Associated Diagnoses: Primary hypertension           CONTINUE these medications which have NOT CHANGED    Details   DULoxetine (CYMBALTA) 60 MG capsule Take 60 mg by mouth daily      Fish Oil-Cholecalciferol (OMEGA-3 FISH OIL/VITAMIN D3) 9200-5665 MG-UNIT CAPS Take 1 capsule by mouth daily      RESTASIS 0.05 % ophthalmic emulsion Place 1 drop into both eyes 2 times daily      sennosides (SENOKOT) 8.6 MG tablet Take 1 tablet by mouth at bedtime      tacrolimus (PROTOPIC) 0.1 % external ointment Apply topically 2 times daily as needed      triamcinolone (KENALOG) 0.1 % external ointment Apply topically 2 times daily  as needed APPLY TOPICALLY TO THE CHEST AND NECK TWICE DAILY AS NEEDED      vitamin (B COMPLEX) capsule Take 1 tablet by mouth daily      vitamin B complex with vitamin C (STRESS TAB) tablet Take 1 tablet by mouth daily      vitamin C (ASCORBIC ACID) 500 MG tablet Take 1 tablet by mouth 2 times daily      vitamin D3 (CHOLECALCIFEROL) 50 mcg (2000 units) tablet Take 1 tablet by mouth daily      Vitamin E 45 MG (100 UNIT) CAPS Take 1 tablet by mouth daily                   Consultations:   No consultations were requested during this admission            Discharge Exam:   Blood pressure (!) 156/90, pulse 68, temperature 97.9  F (36.6  C), temperature source Oral, resp. rate 18, weight 103.5 kg (228 lb 2.8 oz), SpO2 98%.  GENERAL APPEARANCE: healthy, alert and no distress  EYES: conjunctiva clear, eyes grossly normal  HENT: external ears and nose normal   NECK: supple, no masses or adenopathy  RESP: lungs clear to auscultation - no rales, rhonchi or wheezes  CV: regular rate and rhythm, normal S1 S2, no S3 or S4 and no murmur, click or rub   ABDOMEN: soft, nontender, no HSM or masses and bowel sounds normal  MS: no clubbing, cyanosis; no edema  SKIN: clear without significant rashes or lesions  NEURO: Normal strength and tone, sensory exam grossly normal, mentation intact and speech normal    Unresulted Labs Ordered in the Past 30 Days of this Admission       No orders found from 11/28/2023 to 12/29/2023.            No results found for this or any previous visit (from the past 24 hour(s)).         Pending Tests at Discharge:   None         Discharge Instructions and Follow-Up:     Discharge diet: Regular   Discharge activity: Activity as tolerated   Discharge follow-up: Follow up with primary care provider in 1-2 weeks           Discharge Disposition:     Discharged to home      Attestation:  I have reviewed today's vital signs, notes, medications, labs and imaging.    Time Spent on this Encounter   I, Jesus Manuel Weiss MD,  personally saw the patient today and spent greater than 30 minutes discharging this patient.    Jesus Manuel Weiss MD

## 2023-12-31 NOTE — PLAN OF CARE
Problem: Pancreatitis  Goal: Absence of Infection Signs and Symptoms  Outcome: Progressing     Problem: Pain Acute  Goal: Optimal Pain Control and Function  Outcome: Progressing   Goal Outcome Evaluation: Pt denies nausea. Reports abdominal discomfort has improved since admission & feels she has more of an appetite. However, she has c/o a consistent ongoing headache that is on both sides of her head w/ L>R & behind her eyes. She reports getting some relief w/ use of tylenol, oxycodone & ice packs. She has reported some feelings of anxiety re: a variety of subjects including her illness & potential causes, her hospitalization, & the stress she's feeling being away from home leaving her elderly mother to care for her son and managing his meds. Page sent to  Sister re: pt's anxiety & he ordered prn Xanax.

## 2023-12-31 NOTE — PROGRESS NOTES
[facility] DISCHARGE NOTE    Patient discharged to home at 3:10 PM via ambulation. Accompanied by self. Discharge instructions reviewed with patient, opportunity offered to ask questions. Prescriptions sent to patients preferred pharmacy. All belongings sent with patient.    Ashley Lyle RN

## 2024-08-01 ENCOUNTER — HOSPITAL ENCOUNTER (EMERGENCY)
Facility: CLINIC | Age: 59
Discharge: OTHER INSTITUTION | End: 2024-08-01
Attending: EMERGENCY MEDICINE | Admitting: EMERGENCY MEDICINE
Payer: COMMERCIAL

## 2024-08-01 ENCOUNTER — HOSPITAL ENCOUNTER (INPATIENT)
Facility: CLINIC | Age: 59
LOS: 2 days | Discharge: HOME | End: 2024-08-03
Attending: STUDENT IN AN ORGANIZED HEALTH CARE EDUCATION/TRAINING PROGRAM | Admitting: STUDENT IN AN ORGANIZED HEALTH CARE EDUCATION/TRAINING PROGRAM
Payer: COMMERCIAL

## 2024-08-01 ENCOUNTER — APPOINTMENT (OUTPATIENT)
Dept: CT IMAGING | Facility: CLINIC | Age: 59
End: 2024-08-01
Attending: EMERGENCY MEDICINE
Payer: COMMERCIAL

## 2024-08-01 VITALS
WEIGHT: 230 LBS | OXYGEN SATURATION: 98 % | HEART RATE: 86 BPM | RESPIRATION RATE: 19 BRPM | SYSTOLIC BLOOD PRESSURE: 149 MMHG | DIASTOLIC BLOOD PRESSURE: 80 MMHG | TEMPERATURE: 98.8 F | HEIGHT: 64 IN | BODY MASS INDEX: 39.27 KG/M2

## 2024-08-01 DIAGNOSIS — K85.20 ALCOHOL-INDUCED ACUTE PANCREATITIS WITHOUT INFECTION OR NECROSIS: Primary | ICD-10-CM

## 2024-08-01 DIAGNOSIS — K85.20 ALCOHOL-INDUCED ACUTE PANCREATITIS WITHOUT INFECTION OR NECROSIS: ICD-10-CM

## 2024-08-01 PROBLEM — F32.0 DEPRESSION, MAJOR, SINGLE EPISODE, MILD (H): Status: RESOLVED | Noted: 2021-05-12 | Resolved: 2024-08-01

## 2024-08-01 PROBLEM — F10.10 ALCOHOL ABUSE: Status: ACTIVE | Noted: 2023-12-28

## 2024-08-01 PROBLEM — F32.A DEPRESSIVE DISORDER: Status: ACTIVE | Noted: 2017-08-31

## 2024-08-01 PROBLEM — F43.10 PTSD (POST-TRAUMATIC STRESS DISORDER): Status: ACTIVE | Noted: 2021-05-12

## 2024-08-01 LAB
ALBUMIN SERPL BCG-MCNC: 4.5 G/DL (ref 3.5–5.2)
ALP SERPL-CCNC: 107 U/L (ref 40–150)
ALT SERPL W P-5'-P-CCNC: 71 U/L (ref 0–50)
ANION GAP SERPL CALCULATED.3IONS-SCNC: 12 MMOL/L (ref 7–15)
AST SERPL W P-5'-P-CCNC: 40 U/L (ref 0–45)
BILIRUB SERPL-MCNC: 0.6 MG/DL
BUN SERPL-MCNC: 9.2 MG/DL (ref 8–23)
CALCIUM SERPL-MCNC: 9.8 MG/DL (ref 8.8–10.4)
CHLORIDE SERPL-SCNC: 98 MMOL/L (ref 98–107)
CHOLEST SERPL-MCNC: 169 MG/DL
CREAT SERPL-MCNC: 0.64 MG/DL (ref 0.51–0.95)
EGFRCR SERPLBLD CKD-EPI 2021: >90 ML/MIN/1.73M2
ERYTHROCYTE [DISTWIDTH] IN BLOOD BY AUTOMATED COUNT: 13.1 % (ref 10–15)
GLUCOSE SERPL-MCNC: 112 MG/DL (ref 70–99)
HCO3 SERPL-SCNC: 28 MMOL/L (ref 22–29)
HCT VFR BLD AUTO: 38.4 % (ref 35–47)
HDLC SERPL-MCNC: 48 MG/DL
HGB BLD-MCNC: 13.4 G/DL (ref 11.7–15.7)
HOLD SPECIMEN: NORMAL
INR PPP: 1.01 (ref 0.85–1.15)
LDLC SERPL CALC-MCNC: 94 MG/DL
LIPASE SERPL-CCNC: 821 U/L (ref 13–60)
MCH RBC QN AUTO: 31.1 PG (ref 26.5–33)
MCHC RBC AUTO-ENTMCNC: 34.9 G/DL (ref 31.5–36.5)
MCV RBC AUTO: 89 FL (ref 78–100)
NONHDLC SERPL-MCNC: 121 MG/DL
PLATELET # BLD AUTO: 250 10E3/UL (ref 150–450)
POTASSIUM SERPL-SCNC: 3.9 MMOL/L (ref 3.4–5.3)
PROT SERPL-MCNC: 7.7 G/DL (ref 6.4–8.3)
RBC # BLD AUTO: 4.31 10E6/UL (ref 3.8–5.2)
SODIUM SERPL-SCNC: 138 MMOL/L (ref 135–145)
TRIGL SERPL-MCNC: 135 MG/DL
WBC # BLD AUTO: 10.3 10E3/UL (ref 4–11)

## 2024-08-01 PROCEDURE — 85014 HEMATOCRIT: CPT | Performed by: EMERGENCY MEDICINE

## 2024-08-01 PROCEDURE — 96361 HYDRATE IV INFUSION ADD-ON: CPT

## 2024-08-01 PROCEDURE — 85610 PROTHROMBIN TIME: CPT | Performed by: EMERGENCY MEDICINE

## 2024-08-01 PROCEDURE — 250N000011 HC RX IP 250 OP 636: Performed by: EMERGENCY MEDICINE

## 2024-08-01 PROCEDURE — 96375 TX/PRO/DX INJ NEW DRUG ADDON: CPT

## 2024-08-01 PROCEDURE — 250N000011 HC RX IP 250 OP 636

## 2024-08-01 PROCEDURE — 99285 EMERGENCY DEPT VISIT HI MDM: CPT | Mod: 25

## 2024-08-01 PROCEDURE — 80053 COMPREHEN METABOLIC PANEL: CPT | Performed by: EMERGENCY MEDICINE

## 2024-08-01 PROCEDURE — 36415 COLL VENOUS BLD VENIPUNCTURE: CPT

## 2024-08-01 PROCEDURE — 74177 CT ABD & PELVIS W/CONTRAST: CPT

## 2024-08-01 PROCEDURE — 99222 1ST HOSP IP/OBS MODERATE 55: CPT

## 2024-08-01 PROCEDURE — 80061 LIPID PANEL: CPT

## 2024-08-01 PROCEDURE — 250N000013 HC RX MED GY IP 250 OP 250 PS 637: Performed by: EMERGENCY MEDICINE

## 2024-08-01 PROCEDURE — 258N000003 HC RX IP 258 OP 636: Performed by: EMERGENCY MEDICINE

## 2024-08-01 PROCEDURE — 83690 ASSAY OF LIPASE: CPT | Performed by: EMERGENCY MEDICINE

## 2024-08-01 PROCEDURE — 258N000003 HC RX IP 258 OP 636

## 2024-08-01 PROCEDURE — 36415 COLL VENOUS BLD VENIPUNCTURE: CPT | Performed by: EMERGENCY MEDICINE

## 2024-08-01 PROCEDURE — 96374 THER/PROPH/DIAG INJ IV PUSH: CPT | Mod: 59

## 2024-08-01 PROCEDURE — 250N000013 HC RX MED GY IP 250 OP 250 PS 637

## 2024-08-01 PROCEDURE — 120N000001 HC R&B MED SURG/OB

## 2024-08-01 RX ORDER — AMOXICILLIN 250 MG
1 CAPSULE ORAL 2 TIMES DAILY PRN
Status: DISCONTINUED | OUTPATIENT
Start: 2024-08-01 | End: 2024-08-03 | Stop reason: HOSPADM

## 2024-08-01 RX ORDER — AMLODIPINE BESYLATE 5 MG/1
5 TABLET ORAL DAILY
Status: DISCONTINUED | OUTPATIENT
Start: 2024-08-02 | End: 2024-08-03 | Stop reason: HOSPADM

## 2024-08-01 RX ORDER — ONDANSETRON 2 MG/ML
4 INJECTION INTRAMUSCULAR; INTRAVENOUS ONCE
Status: COMPLETED | OUTPATIENT
Start: 2024-08-01 | End: 2024-08-01

## 2024-08-01 RX ORDER — CALCIUM CARBONATE 500 MG/1
1000 TABLET, CHEWABLE ORAL 4 TIMES DAILY PRN
Status: DISCONTINUED | OUTPATIENT
Start: 2024-08-01 | End: 2024-08-03 | Stop reason: HOSPADM

## 2024-08-01 RX ORDER — HYDROXYZINE HYDROCHLORIDE 25 MG/1
25 TABLET, FILM COATED ORAL 3 TIMES DAILY PRN
COMMUNITY

## 2024-08-01 RX ORDER — NALOXONE HYDROCHLORIDE 0.4 MG/ML
0.2 INJECTION, SOLUTION INTRAMUSCULAR; INTRAVENOUS; SUBCUTANEOUS
Status: DISCONTINUED | OUTPATIENT
Start: 2024-08-01 | End: 2024-08-03 | Stop reason: HOSPADM

## 2024-08-01 RX ORDER — MORPHINE SULFATE 4 MG/ML
4 INJECTION, SOLUTION INTRAMUSCULAR; INTRAVENOUS ONCE
Status: COMPLETED | OUTPATIENT
Start: 2024-08-01 | End: 2024-08-01

## 2024-08-01 RX ORDER — IOPAMIDOL 755 MG/ML
90 INJECTION, SOLUTION INTRAVASCULAR ONCE
Status: COMPLETED | OUTPATIENT
Start: 2024-08-01 | End: 2024-08-01

## 2024-08-01 RX ORDER — HYDROMORPHONE HCL IN WATER/PF 6 MG/30 ML
0.2 PATIENT CONTROLLED ANALGESIA SYRINGE INTRAVENOUS
Status: DISCONTINUED | OUTPATIENT
Start: 2024-08-01 | End: 2024-08-03 | Stop reason: HOSPADM

## 2024-08-01 RX ORDER — NALOXONE HYDROCHLORIDE 0.4 MG/ML
0.4 INJECTION, SOLUTION INTRAMUSCULAR; INTRAVENOUS; SUBCUTANEOUS
Status: DISCONTINUED | OUTPATIENT
Start: 2024-08-01 | End: 2024-08-03 | Stop reason: HOSPADM

## 2024-08-01 RX ORDER — DULOXETIN HYDROCHLORIDE 30 MG/1
60 CAPSULE, DELAYED RELEASE ORAL DAILY
Status: DISCONTINUED | OUTPATIENT
Start: 2024-08-02 | End: 2024-08-03 | Stop reason: HOSPADM

## 2024-08-01 RX ORDER — GABAPENTIN 100 MG/1
100 CAPSULE ORAL 2 TIMES DAILY
Status: ON HOLD | COMMUNITY
End: 2024-08-03

## 2024-08-01 RX ORDER — ONDANSETRON 4 MG/1
4 TABLET, ORALLY DISINTEGRATING ORAL EVERY 6 HOURS PRN
Status: DISCONTINUED | OUTPATIENT
Start: 2024-08-01 | End: 2024-08-03 | Stop reason: HOSPADM

## 2024-08-01 RX ORDER — LISINOPRIL 5 MG/1
5 TABLET ORAL DAILY
Status: DISCONTINUED | OUTPATIENT
Start: 2024-08-02 | End: 2024-08-03

## 2024-08-01 RX ORDER — AMLODIPINE BESYLATE 5 MG/1
5 TABLET ORAL DAILY
COMMUNITY

## 2024-08-01 RX ORDER — ATORVASTATIN CALCIUM 10 MG/1
10 TABLET, FILM COATED ORAL DAILY
Status: DISCONTINUED | OUTPATIENT
Start: 2024-08-02 | End: 2024-08-03 | Stop reason: HOSPADM

## 2024-08-01 RX ORDER — GABAPENTIN 100 MG/1
100 CAPSULE ORAL 2 TIMES DAILY
Status: DISCONTINUED | OUTPATIENT
Start: 2024-08-01 | End: 2024-08-03 | Stop reason: HOSPADM

## 2024-08-01 RX ORDER — ATORVASTATIN CALCIUM 10 MG/1
10 TABLET, FILM COATED ORAL DAILY
COMMUNITY

## 2024-08-01 RX ORDER — AMOXICILLIN 250 MG
2 CAPSULE ORAL 2 TIMES DAILY PRN
Status: DISCONTINUED | OUTPATIENT
Start: 2024-08-01 | End: 2024-08-03 | Stop reason: HOSPADM

## 2024-08-01 RX ORDER — HYDROMORPHONE HCL IN WATER/PF 6 MG/30 ML
0.4 PATIENT CONTROLLED ANALGESIA SYRINGE INTRAVENOUS
Status: DISCONTINUED | OUTPATIENT
Start: 2024-08-01 | End: 2024-08-03 | Stop reason: HOSPADM

## 2024-08-01 RX ORDER — ALPRAZOLAM 0.5 MG/1
0.25 TABLET, EXTENDED RELEASE ORAL 3 TIMES DAILY PRN
Status: DISCONTINUED | OUTPATIENT
Start: 2024-08-01 | End: 2024-08-03 | Stop reason: HOSPADM

## 2024-08-01 RX ORDER — ONDANSETRON 2 MG/ML
4 INJECTION INTRAMUSCULAR; INTRAVENOUS EVERY 6 HOURS PRN
Status: DISCONTINUED | OUTPATIENT
Start: 2024-08-01 | End: 2024-08-03 | Stop reason: HOSPADM

## 2024-08-01 RX ORDER — OXYCODONE HYDROCHLORIDE 5 MG/1
5 TABLET ORAL EVERY 4 HOURS PRN
Status: DISCONTINUED | OUTPATIENT
Start: 2024-08-01 | End: 2024-08-01

## 2024-08-01 RX ORDER — SENNOSIDES 8.6 MG
1 TABLET ORAL AT BEDTIME
Status: DISCONTINUED | OUTPATIENT
Start: 2024-08-01 | End: 2024-08-03

## 2024-08-01 RX ORDER — LIDOCAINE 40 MG/G
CREAM TOPICAL
Status: DISCONTINUED | OUTPATIENT
Start: 2024-08-01 | End: 2024-08-03 | Stop reason: HOSPADM

## 2024-08-01 RX ORDER — HYDROXYZINE HYDROCHLORIDE 25 MG/1
25 TABLET, FILM COATED ORAL 3 TIMES DAILY PRN
Status: DISCONTINUED | OUTPATIENT
Start: 2024-08-01 | End: 2024-08-03 | Stop reason: HOSPADM

## 2024-08-01 RX ORDER — ACETAMINOPHEN 325 MG/1
650 TABLET ORAL EVERY 4 HOURS PRN
Status: DISCONTINUED | OUTPATIENT
Start: 2024-08-01 | End: 2024-08-03 | Stop reason: HOSPADM

## 2024-08-01 RX ORDER — DOCUSATE SODIUM 100 MG/1
100 CAPSULE, LIQUID FILLED ORAL 3 TIMES DAILY PRN
COMMUNITY

## 2024-08-01 RX ORDER — SODIUM CHLORIDE, SODIUM LACTATE, POTASSIUM CHLORIDE, CALCIUM CHLORIDE 600; 310; 30; 20 MG/100ML; MG/100ML; MG/100ML; MG/100ML
INJECTION, SOLUTION INTRAVENOUS CONTINUOUS
Status: DISCONTINUED | OUTPATIENT
Start: 2024-08-01 | End: 2024-08-03 | Stop reason: HOSPADM

## 2024-08-01 RX ORDER — OXYCODONE HYDROCHLORIDE 5 MG/1
5 TABLET ORAL ONCE
Status: COMPLETED | OUTPATIENT
Start: 2024-08-01 | End: 2024-08-01

## 2024-08-01 RX ADMIN — ONDANSETRON 4 MG: 2 INJECTION INTRAMUSCULAR; INTRAVENOUS at 17:34

## 2024-08-01 RX ADMIN — SENNOSIDES 1 TABLET: 8.6 TABLET, FILM COATED ORAL at 21:45

## 2024-08-01 RX ADMIN — FAMOTIDINE 20 MG: 10 INJECTION, SOLUTION INTRAVENOUS at 12:27

## 2024-08-01 RX ADMIN — FAMOTIDINE 20 MG: 10 INJECTION, SOLUTION INTRAVENOUS at 17:34

## 2024-08-01 RX ADMIN — SODIUM CHLORIDE, POTASSIUM CHLORIDE, SODIUM LACTATE AND CALCIUM CHLORIDE: 600; 310; 30; 20 INJECTION, SOLUTION INTRAVENOUS at 17:35

## 2024-08-01 RX ADMIN — OXYCODONE 5 MG: 5 TABLET ORAL at 12:28

## 2024-08-01 RX ADMIN — SODIUM CHLORIDE 1000 ML: 9 INJECTION, SOLUTION INTRAVENOUS at 12:56

## 2024-08-01 RX ADMIN — Medication 5 MG: at 21:45

## 2024-08-01 RX ADMIN — HYDROMORPHONE HYDROCHLORIDE 0.4 MG: 0.2 INJECTION, SOLUTION INTRAMUSCULAR; INTRAVENOUS; SUBCUTANEOUS at 19:57

## 2024-08-01 RX ADMIN — IOPAMIDOL 90 ML: 755 INJECTION, SOLUTION INTRAVENOUS at 13:11

## 2024-08-01 RX ADMIN — HYDROMORPHONE HYDROCHLORIDE 0.2 MG: 0.2 INJECTION, SOLUTION INTRAMUSCULAR; INTRAVENOUS; SUBCUTANEOUS at 17:34

## 2024-08-01 RX ADMIN — ONDANSETRON 4 MG: 2 INJECTION INTRAMUSCULAR; INTRAVENOUS at 12:27

## 2024-08-01 RX ADMIN — MORPHINE SULFATE 4 MG: 4 INJECTION, SOLUTION INTRAMUSCULAR; INTRAVENOUS at 14:20

## 2024-08-01 RX ADMIN — GABAPENTIN 100 MG: 100 CAPSULE ORAL at 19:57

## 2024-08-01 RX ADMIN — ACETAMINOPHEN 650 MG: 325 TABLET, FILM COATED ORAL at 21:44

## 2024-08-01 ASSESSMENT — ENCOUNTER SYMPTOMS
CHILLS: 0
ABDOMINAL PAIN: 1
COUGH: 0
SEIZURES: 0
SHORTNESS OF BREATH: 0
VOMITING: 0
DIAPHORESIS: 0
DIARRHEA: 0
FEVER: 1
PALPITATIONS: 0
HEADACHES: 0
HEARTBURN: 0
NAUSEA: 1
HALLUCINATIONS: 0

## 2024-08-01 ASSESSMENT — ACTIVITIES OF DAILY LIVING (ADL)
ADLS_ACUITY_SCORE: 20
FALL_HISTORY_WITHIN_LAST_SIX_MONTHS: NO
DIFFICULTY_COMMUNICATING: NO
HEARING_DIFFICULTY_OR_DEAF: NO
ADLS_ACUITY_SCORE: 20
CONCENTRATING,_REMEMBERING_OR_MAKING_DECISIONS_DIFFICULTY: NO
ADLS_ACUITY_SCORE: 20
ADLS_ACUITY_SCORE: 20
WEAR_GLASSES_OR_BLIND: YES
CHANGE_IN_FUNCTIONAL_STATUS_SINCE_ONSET_OF_CURRENT_ILLNESS/INJURY: NO
ADLS_ACUITY_SCORE: 35
ADLS_ACUITY_SCORE: 20
DIFFICULTY_EATING/SWALLOWING: NO
TOILETING_ISSUES: NO
DRESSING/BATHING_DIFFICULTY: NO
WALKING_OR_CLIMBING_STAIRS_DIFFICULTY: NO
VISION_MANAGEMENT: GLASSES
DOING_ERRANDS_INDEPENDENTLY_DIFFICULTY: NO
ADLS_ACUITY_SCORE: 20
ADLS_ACUITY_SCORE: 20

## 2024-08-01 ASSESSMENT — COLUMBIA-SUICIDE SEVERITY RATING SCALE - C-SSRS
2. HAVE YOU ACTUALLY HAD ANY THOUGHTS OF KILLING YOURSELF IN THE PAST MONTH?: NO
1. IN THE PAST MONTH, HAVE YOU WISHED YOU WERE DEAD OR WISHED YOU COULD GO TO SLEEP AND NOT WAKE UP?: NO
6. HAVE YOU EVER DONE ANYTHING, STARTED TO DO ANYTHING, OR PREPARED TO DO ANYTHING TO END YOUR LIFE?: NO

## 2024-08-01 ASSESSMENT — LIFESTYLE VARIABLES: SUBSTANCE_ABUSE: 0

## 2024-08-01 NOTE — ED NOTES
Report called to Sandra PLATA at [facility] at this time. Pt leaving ED at this time via private vehicle with sister driving.

## 2024-08-01 NOTE — H&P
"[facility]    History and Physical  Hospital Medicine       Date of Admission:  8/1/2024  Date of Service: 8/1/2024     Assessment & Plan   Heidy Crain is a 59 year old female who presents on 8/1/2024 with abdominal pain and nausea x 3 days in the setting of recent alcohol use. Workup significant for elevated lipase 821 and CT findings of mild pancreatitis. She is being admitted for IV fluids and pain management for acute pancreatitis.    Acute alcohol induced pancreatitis  S/p cholecystectomy    Prior history of alcohol induced pancreatitis 12/2023. Cessation of alcohol since 4/2024 however restarted. She drinks a \"martini\" in the evenings, however not daily. Last drink x 3 days PTA. Experiencing diffuse abdominal pain most prevalent epigastric region with radiation to LUQ. Trying to manage pain at home with left over oxycodone. Having nausea without vomiting. Laboratory workup significant for lipase 821. Lipid panel without hypertriglyceridemia. There is no fever or leukocytosis. CT abdomen/pelvis showing mild pancreatic stranding, no homogenous enhancement, no parenchymal necrosis.   - NPO except for medications, sips of water, and ice chips.  - LR continuously 125 mL/hr  - Pain management with hydromorphone IV 0.2 mg q2hrs PRN and 0.4 mg q2hrs PRN available for moderate/severe pain respectively.    Addendum: Changed diet from NPO to advanced as tolerated 8/1    Alcohol use disorder  Hepatic steatosis    H/o alcohol abuse with cessation 12/2023 after admission for alcohol induced pancreatitis however has since started drinking 4/2024. Prior to pancreatitis she was drinking 1 \"martini\" a night. Reintroduced alcohol and now drinking 1 cocktail in the evenings, however not daily, and takes up to 2 week breaks at times. She states drinking for stress, and wishes to find a healthier outlet. Has tried naltrexone in the past however with poor response. Recently started gabapentin 300 mg " "TID 7/2024. LFTs largely normal outside of elevated ALT 71. CT abdomen/pelvis showing moderate hepatic steatosis. She denies history for alcohol withdrawals or seizures. No withdrawal signs on admission and has not been given anything for this PTA. She is low risk for alcohol withdrawal.  - Encourage alcohol cessation  - Continue gabapentin 300 mg TID    Essential hypertension    Chronic. Blood pressures reviewed and well controlled on admission. Managed PTA with amlodipine 5 mg and lisinopril 5 mg.  - Continue amlodipine and lisinopril.    Anxiety  Depression  Post-traumatic stress disorder (PTSD)    Chronic. Mood appears appropriately. Managed PTA with duloxetine 60 mg, alprazolam 0.25 mg TID PRN, and hydroxyzine 25 mg TID PRN.  - Continue duloxetine daily, alprazolam TID PRN, and hydroxyzine TID PRN.    Hyperlipidemia    Chronic. Managed PTA with atorvastatin 10 mg.  - Continue atorvastatin.     Obesity    BMI 39.5 on admission. Contributes to over morbidity and mortality.  - Encourage weight loss and healthy habits.    Renal stone, right    CT abdomen/pelvis showing stable non-obstructing 3 mm renal stone lower pole of right kidney.    Clinically Significant Risk Factors Present on Admission        # Hypertension: Home medication list includes antihypertensive(s)    # Obesity: Estimated body mass index is 39.48 kg/m  as calculated from the following:    Height as of an earlier encounter on 8/1/24: 1.626 m (5' 4\").    Weight as of an earlier encounter on 8/1/24: 104.3 kg (230 lb).       # Financial/Environmental Concerns:        Diet: NPO for Medical/Clinical Reasons Except for: Meds, Ice Chips    DVT Prophylaxis: Low Risk/Ambulatory with no VTE prophylaxis indicated  Ward Catheter: Not present  Code Status: Full Code    Lines: PIV    Disposition Plan   Medically Ready for Discharge: Anticipated in 2-4 Days    I have discussed patient and formulated plan with attending hospitalist physician, Dr. Kerrie LOPES, " "KIRSTEN MASSEY        Primary Care Physician   Jason Mojica [phone]    History is obtained from the patient, emergency department physician, and review of old records via the EMR.    History of Present Illness   Heidy Crain is a 59 year old female with past medical history of alcohol use, pancreatitis, hypertension, hyperlipidemia, anxiety, depression, PTSD, and obesity now presents on 8/1/2024 with abdominal pain with associated nausea x 3 days.    Heidy states that she started to develop abdominal pain x 3 days ago after eating an [employer] salad. Her pain is diffuse however localizes to the epigastric region with radiation to the LUQ and back. She has had prior cholecystectomy. Pain is achy in nature with occasional cramping. She states that this feels similar to her pain when she was hospitalized 12/2024 for acute alcohol induced pancreatitis. At that time she was drinking 1 \"martini\" every evening. States that she drank during parties when she was younger and stopped for many years until she moved to [place] and drinking was a normal part of the evenings in her household now. After her hospitalization she stopped drinking up until her birthday in April. There were no recurrence of her symptoms during her period of cessation. Reintroduced alcohol and states not drinking as heavily as she did prior. It sounds that she drinks a cocktail in the evenings however not daily. She describes binge drinking. Last drink was a \"martini\" x 4 days ago. She has had some nausea with her pain and states taking a left over antiemetic which helped, she has not vomited. She has had a poor appetite and eating less over the last few days. She has also tried some left over oxycodone which provides her temporally relief. Denies burning in chest, and reports hiatal hernia surgery. No diarrhea or loose stools. Denies fever or chills. Denies history of alcohol withdrawal or seizure. She was found to have elevated " lipase 800's and CT evidence for acute pancreatitis. She was transferred to [facility] for management of pancreatitis with IV fluids and pain management.    Review of Systems   Review of Systems   Constitutional:  Positive for fever. Negative for chills and diaphoresis.   Respiratory:  Negative for cough and shortness of breath.    Cardiovascular:  Negative for chest pain and palpitations.   Gastrointestinal:  Positive for abdominal pain and nausea. Negative for diarrhea, heartburn and vomiting.   Neurological:  Negative for seizures and headaches.   Psychiatric/Behavioral:  Negative for hallucinations and substance abuse.      Past Medical History    Past Medical History:   Diagnosis Date    Anxiety state 02/24/2011    Depression, major, single episode, mild (H24) 05/12/2021    Formatting of this note might be different from the original.   Started at some time before  her divorce, which youngest child was just 2 yo then. Had been on Effexor/venlafaxine 10 years, and stopped working.  Prozac didn't help. Wellbutrin/bupropion  helped some, may have stopped due to side effects, skin issues, not certain. She has been on 60 mg cymbalta for the past 2 years or so and feels li    Depressive disorder 08/31/2017    Diaphragmatic hernia 09/22/2020    Formatting of this note might be different from the original.   repaired 10/15/20, no mesh, this was congenital    PTSD (post-traumatic stress disorder) 05/12/2021    Formatting of this note might be different from the original.   from before and during the 10 year divorce     Past Surgical History   Past Surgical History:   Procedure Laterality Date    CHOLECYSTECTOMY      DIAPHRAGMATIC HERNIA REPAIR  2020      Prior to Admission Medications   Prior to Admission Medications   Prescriptions Last Dose Informant Patient Reported? Taking?   ALPRAZolam (XANAX) 0.25 MG tablet More than a month  No Yes   Sig: Take 1 tablet (0.25 mg) by mouth 3 times daily as needed for anxiety    DULoxetine (CYMBALTA) 60 MG capsule 8/1/2024 Self Yes Yes   Sig: Take 60 mg by mouth daily   Fish Oil-Cholecalciferol (OMEGA-3 FISH OIL/VITAMIN D3) 1816-6109 MG-UNIT CAPS Past Week Self Yes Yes   Sig: Take 1 capsule by mouth daily   RESTASIS 0.05 % ophthalmic emulsion 8/1/2024 Self Yes Yes   Sig: Place 1 drop into both eyes 2 times daily   Vitamin E 45 MG (100 UNIT) CAPS  Self Yes No   Sig: Take 1 tablet by mouth daily   amLODIPine (NORVASC) 5 MG tablet 8/1/2024  Yes Yes   Sig: Take 5 mg by mouth daily   atorvastatin (LIPITOR) 10 MG tablet 7/31/2024  Yes Yes   Sig: Take 10 mg by mouth daily   docusate sodium (COLACE) 100 MG capsule Past Week  Yes Yes   Sig: Take 100 mg by mouth 3 times daily as needed for constipation   gabapentin (NEURONTIN) 100 MG capsule 8/1/2024  Yes Yes   Sig: Take 100 mg by mouth 2 times daily   hydrOXYzine HCl (ATARAX) 25 MG tablet 7/31/2024  Yes Yes   Sig: Take 25 mg by mouth 3 times daily as needed for anxiety   lisinopril (ZESTRIL) 5 MG tablet More than a month  No Yes   Sig: Take 1 tablet (5 mg) by mouth daily   oxyCODONE (ROXICODONE) 5 MG tablet 8/1/2024  No Yes   Sig: Take 1 tablet (5 mg) by mouth every 4 hours as needed for severe pain (IF pain not managed with non-pharmacological and non-opioid interventions)   sennosides (SENOKOT) 8.6 MG tablet More than a month Self Yes Yes   Sig: Take 1 tablet by mouth at bedtime   tacrolimus (PROTOPIC) 0.1 % external ointment  Self Yes No   Sig: Apply topically 2 times daily as needed   triamcinolone (KENALOG) 0.1 % external ointment  Self Yes No   Sig: Apply topically 2 times daily as needed APPLY TOPICALLY TO THE CHEST AND NECK TWICE DAILY AS NEEDED   vitamin (B COMPLEX) capsule  Self Yes No   Sig: Take 1 tablet by mouth daily   vitamin B complex with vitamin C (STRESS TAB) tablet  Self Yes No   Sig: Take 1 tablet by mouth daily   vitamin C (ASCORBIC ACID) 500 MG tablet  Self Yes No   Sig: Take 1 tablet by mouth 2 times daily   vitamin D3  (CHOLECALCIFEROL) 50 mcg (2000 units) tablet  Self Yes No   Sig: Take 1 tablet by mouth daily      Facility-Administered Medications: None     Allergies   Allergies   Allergen Reactions    Atorvastatin Headache     Other Reaction(s): Other - comment required    headache    Escitalopram Rash     Family History    Family History   Problem Relation Age of Onset    Atrial fibrillation Mother     Alcoholism Father     Cerebrovascular Disease Father     Alcoholism Sister     Thyroid Cancer Sister         In remission    No Known Problems Sister     No Known Problems Sister     No Known Problems Brother      Social History   Social History     Socioeconomic History    Marital status: Single     Spouse name: Not on file    Number of children: Not on file    Years of education: Not on file    Highest education level: Not on file   Occupational History    Not on file   Tobacco Use    Smoking status: Not on file    Smokeless tobacco: Not on file   Substance and Sexual Activity    Alcohol use: Not on file    Drug use: Not on file    Sexual activity: Not on file   Other Topics Concern    Not on file   Social History Narrative    Not on file     Social Determinants of Health     Financial Resource Strain: Low Risk  (7/18/2024)    [facility]    Financial Resource Strain     Difficulty of Paying Living Expenses: 3     Difficulty of Paying Living Expenses: Not on file   Food Insecurity: No Food Insecurity (7/18/2024)    [facility]    Food Insecurity     Worried About Running Out of Food in the Last Year: 1   Transportation Needs: No Transportation Needs (7/18/2024)    Received from [facility]    Transportation Needs     Lack of Transportation (Medical): 1   Physical Activity: Not on file   Stress: Not on file   Social Connections: Socially Integrated (7/18/2024)    Received from [facility]    Social Connections     Frequency of Communication with Friends and Family: 0   Interpersonal Safety: Not on file   Housing Stability: Low Risk  (7/18/2024)    Received from [facility]    Housing Stability     Unable to Pay for Housing in the Last Year: 1     Physical Exam   BP (!) 147/81 (BP Location: Left arm)   Pulse 82   Temp 99.5  F (37.5  C) (Oral)   Resp 18   SpO2 97%      Weight: 0 lbs 0 oz There is no height or weight on file to calculate BMI.     Constitutional: Female laying comfortably in bed who appears stated age, alert, oriented x 4, cooperative, polite, pleasant, no apparent distress, appears nontoxic.  Eyes: Eyes are clear, pupils are reactive.  HENT: Oropharynx is clear and moist. No evidence of cranial trauma.  Cardiovascular: Regular rate and rhythm, normal S1 and S2, and no murmur noted. JVP is normal. No lower extremity edema.  Respiratory: Clear to auscultation bilaterally. No wheezes, rhonchi, or crackles. Normal respiratory effort on RA. Speaking in full sentences.  GI: Soft, mildly obese, diffuse mild tenderness to palpation however localizes epigastric region and RUQ, no [place] 's or [place]'s signs, no rebound tenderness, normal bowel sounds, no hepatomegaly.  Genitourinary: Deferred  Musculoskeletal: Normal muscle bulk and tone. Moving extremities appropriately.  Skin: Warm and dry, no rashes.   Neurologic: Neck supple. Cranial nerves are grossly intact.  is symmetric.     Data   Data reviewed today:     I have personally reviewed the following data over the past 24 hrs:    10.3  \   13.4   / 250     138 98 9.2 /  112 (H)   3.9 28 0.64 \     ALT: 71 (H) AST: 40 AP: 107 TBILI: 0.6   ALB: 4.5 TOT PROTEIN: 7.7 LIPASE: 821 (H)     INR:  1.01 PTT:  N/A   D-dimer:  N/A Fibrinogen:  N/A       Recent Results (from the past 24 hour(s))   CT Abdomen Pelvis w Contrast    Narrative    EXAM: CT ABDOMEN PELVIS W CONTRAST  LOCATION: M  [facility]  DATE: 8/1/2024    INDICATION: Recurrent abdominal pain, history of pancreatitis.  COMPARISON: 12/28/2023.  TECHNIQUE: CT scan of the abdomen and pelvis was performed following injection of IV contrast. Multiplanar reformats were obtained. Dose reduction techniques were used.  CONTRAST: ISOVUE 370 90mL.    FINDINGS:   LOWER CHEST: Mild atelectasis. Coronary calcifications.    HEPATOBILIARY: Moderate hepatic steatosis. Cholecystectomy.    PANCREAS: Mild peripancreatic stranding. Homogeneous enhancement.    SPLEEN: Normal.    ADRENAL GLANDS: Normal.    KIDNEYS/BLADDER: Stable nonobstructing 3 mm right renal lower pole stone.    BOWEL: Unremarkable.    LYMPH NODES: No adenopathy.    VASCULATURE: Nonaneurysmal aorta. Minimal atherosclerosis. Patent aortic branch vessels. Patent draining mesenteric and portal veins.    PELVIC ORGANS: Small amount of free pelvic fluid.    MUSCULOSKELETAL: Degenerative changes spine.      Impression    IMPRESSION:     1.  Findings of acute pancreatitis. No parenchymal necrosis at this time.    2.  Minimal free fluid. No free air. No collection.    3.  Moderate hepatic steatosis.

## 2024-08-01 NOTE — ED TRIAGE NOTES
Pt presents to the ED with c/o mid-upper abdominal pain radiating to left side for past couple days. Pt hx of pancreatitis and states it feels similar.

## 2024-08-01 NOTE — PROGRESS NOTES
Reviewing chart due to high probability of admit to Med/Surg unit.   Oni Manriquez RN on 8/1/2024 at 3:11 PM

## 2024-08-01 NOTE — PROGRESS NOTES
[facility] ADMISSION NOTE    Patient admitted to room 2407 at approximately 1633 via wheel chair from emergency room from [facility]. Patient was accompanied by mother and sister.     Verbal SBAR report received from SHERLYN Stephens prior to patient arrival.     Patient ambulated to bed independently. Patient alert and oriented X 3. Pain is controlled with current analgesics.  Medication(s) being used: narcotic analgesics including hydromorphone (Dilaudid).  . Admission vital signs: Blood pressure (!) 147/81, pulse 82, temperature 99.5  F (37.5  C), temperature source Oral, resp. rate 18, SpO2 97%. Patient was oriented to plan of care, call light, bed controls, tv, telephone, bathroom, and visiting hours.     Risk Assessment    The following safety risks were identified during admission: fall. Yellow risk band applied: YES.     Skin Initial Assessment    This writer admitted this patient and completed a full skin assessment and Teja score in the Adult PCS flowsheet.   Photo documentation of skin problem and/or wound competed via Palette application (located under Media):  No    Appropriate interventions initiated as needed.     Secondary skin check completed by Sandra Brennan RN.         Education    Patient has a [place] to Observation order: No  Observation education completed and documented: N/A      Julia Sam   Student Nurse

## 2024-08-02 LAB
ALBUMIN SERPL BCG-MCNC: 3.8 G/DL (ref 3.5–5.2)
ALP SERPL-CCNC: 94 U/L (ref 40–150)
ALT SERPL W P-5'-P-CCNC: 54 U/L (ref 0–50)
ANION GAP SERPL CALCULATED.3IONS-SCNC: 13 MMOL/L (ref 7–15)
AST SERPL W P-5'-P-CCNC: 32 U/L (ref 0–45)
BILIRUB SERPL-MCNC: 0.5 MG/DL
BUN SERPL-MCNC: 7 MG/DL (ref 8–23)
CALCIUM SERPL-MCNC: 8.5 MG/DL (ref 8.8–10.4)
CHLORIDE SERPL-SCNC: 96 MMOL/L (ref 98–107)
CREAT SERPL-MCNC: 0.55 MG/DL (ref 0.51–0.95)
EGFRCR SERPLBLD CKD-EPI 2021: >90 ML/MIN/1.73M2
ERYTHROCYTE [DISTWIDTH] IN BLOOD BY AUTOMATED COUNT: 13 % (ref 10–15)
GLUCOSE SERPL-MCNC: 99 MG/DL (ref 70–99)
HCO3 SERPL-SCNC: 25 MMOL/L (ref 22–29)
HCT VFR BLD AUTO: 33.2 % (ref 35–47)
HGB BLD-MCNC: 11.3 G/DL (ref 11.7–15.7)
INR PPP: 1.08 (ref 0.85–1.15)
MCH RBC QN AUTO: 31.1 PG (ref 26.5–33)
MCHC RBC AUTO-ENTMCNC: 34 G/DL (ref 31.5–36.5)
MCV RBC AUTO: 92 FL (ref 78–100)
PLATELET # BLD AUTO: 172 10E3/UL (ref 150–450)
POTASSIUM SERPL-SCNC: 3.8 MMOL/L (ref 3.4–5.3)
PROT SERPL-MCNC: 6.2 G/DL (ref 6.4–8.3)
RBC # BLD AUTO: 3.63 10E6/UL (ref 3.8–5.2)
SODIUM SERPL-SCNC: 134 MMOL/L (ref 135–145)
WBC # BLD AUTO: 9.5 10E3/UL (ref 4–11)

## 2024-08-02 PROCEDURE — 85610 PROTHROMBIN TIME: CPT

## 2024-08-02 PROCEDURE — 120N000001 HC R&B MED SURG/OB

## 2024-08-02 PROCEDURE — 36415 COLL VENOUS BLD VENIPUNCTURE: CPT

## 2024-08-02 PROCEDURE — 250N000013 HC RX MED GY IP 250 OP 250 PS 637

## 2024-08-02 PROCEDURE — 99232 SBSQ HOSP IP/OBS MODERATE 35: CPT | Performed by: STUDENT IN AN ORGANIZED HEALTH CARE EDUCATION/TRAINING PROGRAM

## 2024-08-02 PROCEDURE — 80053 COMPREHEN METABOLIC PANEL: CPT

## 2024-08-02 PROCEDURE — 250N000011 HC RX IP 250 OP 636: Performed by: STUDENT IN AN ORGANIZED HEALTH CARE EDUCATION/TRAINING PROGRAM

## 2024-08-02 PROCEDURE — 250N000011 HC RX IP 250 OP 636

## 2024-08-02 PROCEDURE — 258N000003 HC RX IP 258 OP 636

## 2024-08-02 PROCEDURE — 85027 COMPLETE CBC AUTOMATED: CPT

## 2024-08-02 RX ORDER — ENOXAPARIN SODIUM 100 MG/ML
40 INJECTION SUBCUTANEOUS EVERY 24 HOURS
Status: DISCONTINUED | OUTPATIENT
Start: 2024-08-02 | End: 2024-08-03 | Stop reason: HOSPADM

## 2024-08-02 RX ADMIN — SODIUM CHLORIDE, POTASSIUM CHLORIDE, SODIUM LACTATE AND CALCIUM CHLORIDE: 600; 310; 30; 20 INJECTION, SOLUTION INTRAVENOUS at 19:56

## 2024-08-02 RX ADMIN — ACETAMINOPHEN 650 MG: 325 TABLET, FILM COATED ORAL at 04:42

## 2024-08-02 RX ADMIN — HYDROMORPHONE HYDROCHLORIDE 0.4 MG: 0.2 INJECTION, SOLUTION INTRAMUSCULAR; INTRAVENOUS; SUBCUTANEOUS at 20:02

## 2024-08-02 RX ADMIN — GABAPENTIN 100 MG: 100 CAPSULE ORAL at 19:56

## 2024-08-02 RX ADMIN — HYDROMORPHONE HYDROCHLORIDE 0.2 MG: 0.2 INJECTION, SOLUTION INTRAMUSCULAR; INTRAVENOUS; SUBCUTANEOUS at 04:42

## 2024-08-02 RX ADMIN — ATORVASTATIN CALCIUM 10 MG: 10 TABLET, FILM COATED ORAL at 08:03

## 2024-08-02 RX ADMIN — HYDROMORPHONE HYDROCHLORIDE 0.4 MG: 0.2 INJECTION, SOLUTION INTRAMUSCULAR; INTRAVENOUS; SUBCUTANEOUS at 12:42

## 2024-08-02 RX ADMIN — AMLODIPINE BESYLATE 5 MG: 5 TABLET ORAL at 08:03

## 2024-08-02 RX ADMIN — ENOXAPARIN SODIUM 40 MG: 40 INJECTION SUBCUTANEOUS at 14:47

## 2024-08-02 RX ADMIN — HYDROMORPHONE HYDROCHLORIDE 0.4 MG: 0.2 INJECTION, SOLUTION INTRAMUSCULAR; INTRAVENOUS; SUBCUTANEOUS at 00:26

## 2024-08-02 RX ADMIN — HYDROMORPHONE HYDROCHLORIDE 0.4 MG: 0.2 INJECTION, SOLUTION INTRAMUSCULAR; INTRAVENOUS; SUBCUTANEOUS at 07:53

## 2024-08-02 RX ADMIN — ONDANSETRON 4 MG: 2 INJECTION INTRAMUSCULAR; INTRAVENOUS at 07:50

## 2024-08-02 RX ADMIN — SODIUM CHLORIDE, POTASSIUM CHLORIDE, SODIUM LACTATE AND CALCIUM CHLORIDE: 600; 310; 30; 20 INJECTION, SOLUTION INTRAVENOUS at 12:43

## 2024-08-02 RX ADMIN — LISINOPRIL 5 MG: 5 TABLET ORAL at 08:04

## 2024-08-02 RX ADMIN — ACETAMINOPHEN 650 MG: 325 TABLET, FILM COATED ORAL at 10:45

## 2024-08-02 RX ADMIN — SODIUM CHLORIDE, POTASSIUM CHLORIDE, SODIUM LACTATE AND CALCIUM CHLORIDE: 600; 310; 30; 20 INJECTION, SOLUTION INTRAVENOUS at 00:26

## 2024-08-02 RX ADMIN — SENNOSIDES AND DOCUSATE SODIUM 1 TABLET: 50; 8.6 TABLET ORAL at 00:26

## 2024-08-02 RX ADMIN — HYDROXYZINE HYDROCHLORIDE 25 MG: 25 TABLET, FILM COATED ORAL at 17:38

## 2024-08-02 RX ADMIN — FAMOTIDINE 20 MG: 10 INJECTION, SOLUTION INTRAVENOUS at 17:40

## 2024-08-02 RX ADMIN — SODIUM CHLORIDE, POTASSIUM CHLORIDE, SODIUM LACTATE AND CALCIUM CHLORIDE: 600; 310; 30; 20 INJECTION, SOLUTION INTRAVENOUS at 06:44

## 2024-08-02 RX ADMIN — CALCIUM CARBONATE (ANTACID) CHEW TAB 500 MG 1000 MG: 500 CHEW TAB at 08:02

## 2024-08-02 RX ADMIN — CALCIUM CARBONATE (ANTACID) CHEW TAB 500 MG 1000 MG: 500 CHEW TAB at 14:51

## 2024-08-02 RX ADMIN — SENNOSIDES 1 TABLET: 8.6 TABLET, FILM COATED ORAL at 19:56

## 2024-08-02 RX ADMIN — ACETAMINOPHEN 650 MG: 325 TABLET, FILM COATED ORAL at 17:39

## 2024-08-02 RX ADMIN — HYDROMORPHONE HYDROCHLORIDE 0.4 MG: 0.2 INJECTION, SOLUTION INTRAMUSCULAR; INTRAVENOUS; SUBCUTANEOUS at 14:52

## 2024-08-02 RX ADMIN — HYDROXYZINE HYDROCHLORIDE 25 MG: 25 TABLET, FILM COATED ORAL at 10:45

## 2024-08-02 RX ADMIN — HYDROXYZINE HYDROCHLORIDE 25 MG: 25 TABLET, FILM COATED ORAL at 00:26

## 2024-08-02 RX ADMIN — GABAPENTIN 100 MG: 100 CAPSULE ORAL at 08:04

## 2024-08-02 RX ADMIN — ACETAMINOPHEN 650 MG: 325 TABLET, FILM COATED ORAL at 22:37

## 2024-08-02 RX ADMIN — HYDROXYZINE HYDROCHLORIDE 25 MG: 25 TABLET, FILM COATED ORAL at 22:37

## 2024-08-02 RX ADMIN — DULOXETINE HYDROCHLORIDE 60 MG: 30 CAPSULE, DELAYED RELEASE ORAL at 08:03

## 2024-08-02 ASSESSMENT — ACTIVITIES OF DAILY LIVING (ADL)
ADLS_ACUITY_SCORE: 23
ADLS_ACUITY_SCORE: 23
ADLS_ACUITY_SCORE: 22
ADLS_ACUITY_SCORE: 23

## 2024-08-02 NOTE — PROGRESS NOTES
Patient requested temperature check.  New fever of 101.  Tylenol given.  Dr. Chandler notified via page at this time per order parameters.

## 2024-08-02 NOTE — PLAN OF CARE
Problem: Adult Inpatient Plan of Care  Goal: Optimal Comfort and Wellbeing  Outcome: Not Progressing     Problem: Pancreatitis  Goal: Optimal Nutrition Delivery  Outcome: Not Progressing   Goal Outcome Evaluation:    Up with SBA and A&O x4. Patient reports continuous abdominal and back pain- 7/10. Requested Dilaudid. Patient states she takes oxycodone at home and it has not been helpful. Continuous IVF running at 150. Complained of nausea and IV Zofran given. PRN tylenol and hot packs given for back pain. Able to make needs known.    Julia Sam  Student Nurse

## 2024-08-02 NOTE — PROGRESS NOTES
"[facility]    Medicine Progress Note - Hospitalist Service    Date of Admission:  8/1/2024    Assessment & Plan   Heidy Crain is a 59 year old female with past medical history of alcohol use, pancreatitis, hypertension, hyperlipidemia, anxiety, depression, PTSD, and obesity who presented on 8/1/2024 with abdominal pain and nausea x 3 days in the setting of recent alcohol use. FTH pancreatitis. Gradual improvement with IV fluids and pain management.     # Acute alcohol induced pancreatitis  # S/p cholecystectomy 2015    Prior history of alcohol induced pancreatitis 12/2023. Cessation of alcohol since 4/2024 however restarted. She drinks a \"martini\" in the evenings, however not daily. Last drink x 3 days PTA. Experiencing diffuse abdominal pain most prevalent epigastric region with radiation to LUQ. Trying to manage pain at home with left over oxycodone. Having nausea without vomiting. Laboratory workup significant for lipase 821. Lipid panel without hypertriglyceridemia. There is no fever or leukocytosis. CT abdomen/pelvis showing mild pancreatic stranding, no homogenous enhancement, no parenchymal necrosis.   - Advancing diet as tolerated   - LR continuously 125 mL/hr  - Pain management with hydromorphone IV 0.2 mg q2hrs PRN and 0.4 mg q2hrs PRN available for moderate/severe pain respectively.    # Alcohol use disorder  # Hepatic steatosis    H/o alcohol abuse with cessation 12/2023 after admission for alcohol induced pancreatitis however has since started drinking 4/2024. Prior to pancreatitis she was drinking 1 \"martini\" a night. Reintroduced alcohol and now drinking 1 cocktail in the evenings, however not daily, and takes up to 2 week breaks at times. She states drinking for stress, and wishes to find a healthier outlet. Has tried naltrexone in the past however with poor response. Recently started gabapentin 300 mg TID 7/2024. LFTs largely normal outside of elevated ALT 71. CT " "abdomen/pelvis showing moderate hepatic steatosis. She denies history for alcohol withdrawals or seizures. No withdrawal signs on admission and has not been given anything for this PTA. She is low risk for alcohol withdrawal.  - Encourage alcohol cessation  - Continue gabapentin 300 mg TID    # Essential hypertension  Chronic. Blood pressures reviewed and well controlled on admission. Managed PTA with amlodipine 5 mg and lisinopril 5 mg.  - Continued PTA amlodipine and lisinopril.    # Anxiety  # Depression  # Post-traumatic stress disorder (PTSD)  Chronic. Mood appears appropriately. Managed PTA with duloxetine 60 mg, alprazolam 0.25 mg TID PRN, and hydroxyzine 25 mg TID PRN.  - Continued PTA duloxetine daily, alprazolam TID PRN, and hydroxyzine TID PRN.    # Hyperlipidemia  Chronic. Managed PTA with atorvastatin 10 mg.  - Continued PTA atorvastatin.     # Obesity    BMI 39.5 on admission. Contributes to over morbidity and mortality. Further discussion pending course.     # Renal stone, right    CT abdomen/pelvis showing stable non-obstructing 3 mm renal stone lower pole of right kidney.          Diet: Advance Diet as Tolerated: Clear Liquid Diet    DVT Prophylaxis: Enoxaparin (Lovenox) SQ  Ward Catheter: Not present  Lines: None     Cardiac Monitoring: None  Code Status: Full Code      Clinically Significant Risk Factors Present on Admission          # Hypocalcemia: Lowest Ca = 8.5 mg/dL in last 2 days, will monitor and replace as appropriate         # Hypertension: Home medication list includes antihypertensive(s)         # Severe Obesity: Estimated body mass index is 40.11 kg/m  as calculated from the following:    Height as of this encounter: 1.626 m (5' 4\").    Weight as of this encounter: 106 kg (233 lb 11 oz).       # Financial/Environmental Concerns:           Disposition Plan     Medically Ready for Discharge: Anticipated Tomorrow     Kunal Chandler MD  Hospitalist Service  [facility] " [facility]  Securely message with Charly (more info)  Text page via [facility] Paging/Directory   ______________________________________________________________________    Interval History   Admitted yesterday, no acute events since admission. Continues to require pain medications overnight but was mari to tolerate some liquids this morning. States that she felt hot overnight but denies chills. No vomiting. Acknowledges that she had consumed EtOH leading up to her pancreatitis. States that she intends to stop drinking EtOH.      Physical Exam   Vital Signs: Temp: 100.2  F (37.9  C) Temp src: Oral BP: 137/74 Pulse: 84   Resp: 16 SpO2: 91 % O2 Device: None (Room air)    Weight: 233 lbs 11 oz    General Appearance: Awake and alert, laying back in bed, appears uncomfortable but no in a cute distress   Respiratory: Breathing easily on room air   Cardiovascular: Regular rate and rhythm, extremities warm and well perfused   GI: Abdomen distended but soft, tender to palpation in the upper quadrants and worse in the epigastric region   Skin: No rashes or lesions   Other: Appropriate mood and affect      Medical Decision Making       45 MINUTES SPENT BY ME on the date of service doing chart review, history, exam, documentation & further activities per the note.      Data     I have personally reviewed the following data over the past 24 hrs:    9.5  \   11.3 (L)   / 172     134 (L) 96 (L) 7.0 (L) /  99   3.8 25 0.55 \     ALT: 54 (H) AST: 32 AP: 94 TBILI: 0.5   ALB: 3.8 TOT PROTEIN: 6.2 (L) LIPASE: N/A     INR:  1.08 PTT:  N/A   D-dimer:  N/A Fibrinogen:  N/A       Imaging results reviewed over the past 24 hrs:   No results found for this or any previous visit (from the past 24 hour(s)).

## 2024-08-02 NOTE — PLAN OF CARE
Problem: Adult Inpatient Plan of Care  Goal: Plan of Care Review  Description: The Plan of Care Review/Shift note should be completed every shift.  The Outcome Evaluation is a brief statement about your assessment that the patient is improving, declining, or no change.  This information will be displayed automatically on your shift  note.  Outcome: Progressing  Flowsheets (Taken 8/2/2024 0503)  Plan of Care Reviewed With: patient  Overall Patient Progress: improving  Goal: Optimal Comfort and Wellbeing  Outcome: Progressing  Intervention: Monitor Pain and Promote Comfort  Recent Flowsheet Documentation  Taken 8/2/2024 0455 by Mireille Ba RN  Pain Management Interventions:   pillow support provided   repositioned  Taken 8/2/2024 0442 by Mireille Ba RN  Pain Management Interventions: medication (see MAR)  Taken 8/2/2024 0130 by Mireille Ba RN  Pain Management Interventions:   pillow support provided   repositioned  Taken 8/2/2024 0045 by Mireille Ba RN  Pain Management Interventions: pillow support provided  Taken 8/2/2024 0026 by Mireille Ba RN  Pain Management Interventions: medication (see MAR)  Intervention: Provide Person-Centered Care  Recent Flowsheet Documentation  Taken 8/2/2024 0455 by Mireille Ba RN  Trust Relationship/Rapport:   care explained   choices provided   questions answered   reassurance provided   thoughts/feelings acknowledged     Problem: Pancreatitis  Goal: Optimal Pain Control and Function  Outcome: Progressing  Intervention: Monitor and Manage Pain  Recent Flowsheet Documentation  Taken 8/2/2024 0455 by Mireille Ba RN  Pain Management Interventions:   pillow support provided   repositioned  Sensory Stimulation Regulation:   auditory stimulation minimized   lighting decreased   care clustered  Taken 8/2/2024 0442 by Mireille Ba RN  Pain Management Interventions: medication (see MAR)  Taken 8/2/2024 0130 by Mireille Ba RN  Pain Management Interventions:   pillow support provided    repositioned  Taken 8/2/2024 0045 by Mireille Ba RN  Pain Management Interventions: pillow support provided  Taken 8/2/2024 0026 by Mireille Ba RN  Pain Management Interventions: medication (see MAR)  Goal: Effective Oxygenation and Ventilation  Outcome: Progressing  Intervention: Optimize Oxygenation and Ventilation  Recent Flowsheet Documentation  Taken 8/2/2024 0455 by Mireille Ba RN  Cough And Deep Breathing: done independently per patient  Airway/Ventilation Management:   airway patency maintained   pulmonary hygiene promoted  Activity Management: activity encouraged  Taken 8/2/2024 0442 by Mireille Ba RN  Activity Management: ambulated to bathroom  Head of Bed (HOB) Positioning: HOB at 20-30 degrees  Taken 8/2/2024 0130 by Mierille Ba RN  Head of Bed (HOB) Positioning: HOB at 20-30 degrees  Taken 8/2/2024 0026 by Mireille Ba RN  Activity Management: ambulated to bathroom  Head of Bed (HOB) Positioning: HOB at 30 degrees     Problem: Adult Inpatient Plan of Care  Goal: Plan of Care Review  Description: The Plan of Care Review/Shift note should be completed every shift.  The Outcome Evaluation is a brief statement about your assessment that the patient is improving, declining, or no change.  This information will be displayed automatically on your shift  note.  Recent Flowsheet Documentation  Taken 8/2/2024 0503 by Mireille Ba RN  Plan of Care Reviewed With: patient  Overall Patient Progress: improving  Goal: Absence of Hospital-Acquired Illness or Injury  Intervention: Identify and Manage Fall Risk  Recent Flowsheet Documentation  Taken 8/2/2024 0455 by Mireille Ba RN  Safety Promotion/Fall Prevention:   activity supervised   clutter free environment maintained   lighting adjusted   increase visualization of patient   nonskid shoes/slippers when out of bed   room near nurse's station  Intervention: Prevent Skin Injury  Recent Flowsheet Documentation  Taken 8/2/2024 0455 by Mireille Ba RN  Skin Protection:    adhesive use limited   transparent dressing maintained   skin to device areas padded  Device Skin Pressure Protection:   absorbent pad utilized/changed   tubing/devices free from skin contact   adhesive use limited  Taken 8/2/2024 0442 by Mireille Ba RN  Body Position: position changed independently  Taken 8/2/2024 0130 by Mireille Ba RN  Body Position: position changed independently  Taken 8/2/2024 0026 by Mireille Ba RN  Body Position: position changed independently  Intervention: Prevent and Manage VTE (Venous Thromboembolism) Risk  Recent Flowsheet Documentation  Taken 8/2/2024 0455 by Mireille Ba RN  VTE Prevention/Management: (ambulation promoted) other (see comments)  Intervention: Prevent Infection  Recent Flowsheet Documentation  Taken 8/2/2024 0455 by Mireille Ba RN  Infection Prevention:   cohorting utilized   single patient room provided   rest/sleep promoted   equipment surfaces disinfected  Goal: Optimal Comfort and Wellbeing  Intervention: Monitor Pain and Promote Comfort  Recent Flowsheet Documentation  Taken 8/2/2024 0455 by Mireille Ba RN  Pain Management Interventions:   pillow support provided   repositioned  Taken 8/2/2024 0442 by Mireille Ba RN  Pain Management Interventions: medication (see MAR)  Taken 8/2/2024 0130 by Mireille Ba RN  Pain Management Interventions:   pillow support provided   repositioned  Taken 8/2/2024 0045 by Mireille Ba RN  Pain Management Interventions: pillow support provided  Taken 8/2/2024 0026 by Mireille Ba RN  Pain Management Interventions: medication (see MAR)  Intervention: Provide Person-Centered Care  Recent Flowsheet Documentation  Taken 8/2/2024 0455 by Mireille Ba RN  Trust Relationship/Rapport:   care explained   choices provided   questions answered   reassurance provided   thoughts/feelings acknowledged     Problem: Pancreatitis  Goal: Absence of Infection Signs and Symptoms  Intervention: Prevent or Manage Infection  Recent Flowsheet Documentation  Taken  8/2/2024 0455 by Mireille Ba RN  Infection Management: aseptic technique maintained  Goal: Optimal Pain Control and Function  Intervention: Monitor and Manage Pain  Recent Flowsheet Documentation  Taken 8/2/2024 0455 by Mireille Ba RN  Pain Management Interventions:   pillow support provided   repositioned  Sensory Stimulation Regulation:   auditory stimulation minimized   lighting decreased   care clustered  Taken 8/2/2024 0442 by Mireille Ba RN  Pain Management Interventions: medication (see MAR)  Taken 8/2/2024 0130 by Mireille Ba RN  Pain Management Interventions:   pillow support provided   repositioned  Taken 8/2/2024 0045 by Mireille Ba RN  Pain Management Interventions: pillow support provided  Taken 8/2/2024 0026 by Mireille Ba RN  Pain Management Interventions: medication (see MAR)  Goal: Effective Oxygenation and Ventilation  Intervention: Optimize Oxygenation and Ventilation  Recent Flowsheet Documentation  Taken 8/2/2024 0455 by Mireille Ba RN  Cough And Deep Breathing: done independently per patient  Airway/Ventilation Management:   airway patency maintained   pulmonary hygiene promoted  Activity Management: activity encouraged  Taken 8/2/2024 0442 by Mireille Ba RN  Activity Management: ambulated to bathroom  Head of Bed (HOB) Positioning: HOB at 20-30 degrees  Taken 8/2/2024 0130 by Mireille Ba RN  Head of Bed (HOB) Positioning: HOB at 20-30 degrees  Taken 8/2/2024 0026 by Mireille Ba RN  Activity Management: ambulated to bathroom  Head of Bed (HOB) Positioning: HOB at 30 degrees     Problem: Adult Inpatient Plan of Care  Goal: Absence of Hospital-Acquired Illness or Injury  Intervention: Identify and Manage Fall Risk  Recent Flowsheet Documentation  Taken 8/2/2024 0455 by Mireille Ba RN  Safety Promotion/Fall Prevention:   activity supervised   clutter free environment maintained   lighting adjusted   increase visualization of patient   nonskid shoes/slippers when out of bed   room near nurse's  station  Intervention: Prevent Skin Injury  Recent Flowsheet Documentation  Taken 8/2/2024 0455 by Mireille Ba RN  Skin Protection:   adhesive use limited   transparent dressing maintained   skin to device areas padded  Device Skin Pressure Protection:   absorbent pad utilized/changed   tubing/devices free from skin contact   adhesive use limited  Taken 8/2/2024 0442 by Mireille Ba RN  Body Position: position changed independently  Taken 8/2/2024 0130 by Mireille Ba RN  Body Position: position changed independently  Taken 8/2/2024 0026 by Mireille Ba RN  Body Position: position changed independently  Intervention: Prevent and Manage VTE (Venous Thromboembolism) Risk  Recent Flowsheet Documentation  Taken 8/2/2024 0455 by Mireille Ba RN  VTE Prevention/Management: (ambulation promoted) other (see comments)  Intervention: Prevent Infection  Recent Flowsheet Documentation  Taken 8/2/2024 0455 by Mireille Ba RN  Infection Prevention:   cohorting utilized   single patient room provided   rest/sleep promoted   equipment surfaces disinfected     Problem: Pancreatitis  Goal: Absence of Infection Signs and Symptoms  Intervention: Prevent or Manage Infection  Recent Flowsheet Documentation  Taken 8/2/2024 0455 by Mireille Ba RN  Infection Management: aseptic technique maintained     Problem: Skin Injury Risk Increased  Goal: Skin Health and Integrity  Intervention: Optimize Skin Protection  Recent Flowsheet Documentation  Taken 8/2/2024 0455 by Mireille Ba RN  Skin Protection:   adhesive use limited   transparent dressing maintained   skin to device areas padded  Activity Management: activity encouraged  Taken 8/2/2024 0442 by Mireille Ba RN  Activity Management: ambulated to bathroom  Head of Bed (HOB) Positioning: HOB at 20-30 degrees  Taken 8/2/2024 0130 by Mireille Ba RN  Head of Bed (HOB) Positioning: HOB at 20-30 degrees  Taken 8/2/2024 0026 by Mireille Ba RN  Activity Management: ambulated to bathroom  Head of Bed  (HOB) Positioning: HOB at 30 degrees   Goal Outcome Evaluation:      Plan of Care Reviewed With: patient    Overall Patient Progress: improvingOverall Patient Progress: improving       Patient A&Ox4.Currently patient denies dyspnea. Patient denies fever/chills. Denies any chest pain/palpitations. Denies any lightheadedness or syncope.Patient reports Left abdominal pain, radiating to back, PRN Dilaudid, Atarax, Tylenol given with some relief. Patient tolerates clear liquid  well, denies any nausea.[facility]Good inspiratory effort,IS up to 1500cc.

## 2024-08-02 NOTE — PLAN OF CARE
"  Problem: Adult Inpatient Plan of Care  Goal: Plan of Care Review  Description: The Plan of Care Review/Shift note should be completed every shift.  The Outcome Evaluation is a brief statement about your assessment that the patient is improving, declining, or no change.  This information will be displayed automatically on your shift  note.  Outcome: Progressing  Flowsheets (Taken 8/2/2024 1854)  Plan of Care Reviewed With: patient  Goal: Patient-Specific Goal (Individualized)  Description: You can add care plan individualizations to a care plan. Examples of Individualization might be:  \"Parent requests to be called daily at 9am for status\", \"I have a hard time hearing out of my right ear\", or \"Do not touch me to wake me up as it startles  me\".  Outcome: Progressing  Goal: Absence of Hospital-Acquired Illness or Injury  Outcome: Progressing  Intervention: Identify and Manage Fall Risk  Recent Flowsheet Documentation  Taken 8/2/2024 1600 by Meaghan Riggs RN  Safety Promotion/Fall Prevention:   activity supervised   lighting adjusted   nonskid shoes/slippers when out of bed   patient and family education  Taken 8/2/2024 0810 by Meaghan Riggs, SHERLYN  Safety Promotion/Fall Prevention:   activity supervised   lighting adjusted   nonskid shoes/slippers when out of bed   patient and family education  Goal: Optimal Comfort and Wellbeing  Outcome: Progressing  Intervention: Monitor Pain and Promote Comfort  Recent Flowsheet Documentation  Taken 8/2/2024 1242 by Meaghan Riggs, RN  Pain Management Interventions: medication (see MAR)  Taken 8/2/2024 0753 by Meaghan Riggs, RN  Pain Management Interventions: medication (see MAR)  Intervention: Provide Person-Centered Care  Recent Flowsheet Documentation  Taken 8/2/2024 1600 by Meaghan Riggs, RN  Trust Relationship/Rapport:   care explained   choices provided  Taken 8/2/2024 0810 by Meaghan Riggs, RN  Trust Relationship/Rapport:   care explained   choices provided  Goal: Readiness " for Transition of Care  Outcome: Progressing     Problem: Pancreatitis  Goal: Fluid and Electrolyte Balance  Outcome: Progressing  Goal: Absence of Infection Signs and Symptoms  Outcome: Progressing  Goal: Optimal Nutrition Delivery  Outcome: Progressing  Goal: Optimal Pain Control and Function  Outcome: Progressing  Intervention: Monitor and Manage Pain  Recent Flowsheet Documentation  Taken 8/2/2024 1242 by Meaghan Riggs, RN  Pain Management Interventions: medication (see MAR)  Taken 8/2/2024 0753 by Meaghan Riggs, RN  Pain Management Interventions: medication (see MAR)  Goal: Effective Oxygenation and Ventilation  Outcome: Progressing     Problem: Skin Injury Risk Increased  Goal: Skin Health and Integrity  Outcome: Progressing   Goal Outcome Evaluation:      Plan of Care Reviewed With: patient    Pain still persistent 6-8/10 and will decrease to 4/10 after medications at best.  She reports pain in back that is uncomfortable as well as abdomen and ribs.  Utilized Iv dilaudid at 0.4 mg about every 4 hours and also atarax and tylenol when available.  Temp max 101.  Patient concerned, and reassurance provided. Taking in small amounts of clear liquids.  Medicated with zofran for nausea and Tums/pepcid for heart burn.

## 2024-08-03 VITALS
BODY MASS INDEX: 39.9 KG/M2 | DIASTOLIC BLOOD PRESSURE: 73 MMHG | WEIGHT: 233.69 LBS | HEART RATE: 82 BPM | SYSTOLIC BLOOD PRESSURE: 130 MMHG | RESPIRATION RATE: 18 BRPM | OXYGEN SATURATION: 95 % | HEIGHT: 64 IN | TEMPERATURE: 98 F

## 2024-08-03 LAB
ALBUMIN SERPL BCG-MCNC: 3.5 G/DL (ref 3.5–5.2)
ALP SERPL-CCNC: 86 U/L (ref 40–150)
ALT SERPL W P-5'-P-CCNC: 43 U/L (ref 0–50)
ANION GAP SERPL CALCULATED.3IONS-SCNC: 9 MMOL/L (ref 7–15)
AST SERPL W P-5'-P-CCNC: 25 U/L (ref 0–45)
BILIRUB DIRECT SERPL-MCNC: <0.2 MG/DL (ref 0–0.3)
BILIRUB SERPL-MCNC: 0.4 MG/DL
BUN SERPL-MCNC: 4.8 MG/DL (ref 8–23)
CALCIUM SERPL-MCNC: 8.5 MG/DL (ref 8.8–10.4)
CHLORIDE SERPL-SCNC: 99 MMOL/L (ref 98–107)
CREAT SERPL-MCNC: 0.52 MG/DL (ref 0.51–0.95)
EGFRCR SERPLBLD CKD-EPI 2021: >90 ML/MIN/1.73M2
ERYTHROCYTE [DISTWIDTH] IN BLOOD BY AUTOMATED COUNT: 12.8 % (ref 10–15)
GLUCOSE SERPL-MCNC: 124 MG/DL (ref 70–99)
HCO3 SERPL-SCNC: 29 MMOL/L (ref 22–29)
HCT VFR BLD AUTO: 30.4 % (ref 35–47)
HGB BLD-MCNC: 10.4 G/DL (ref 11.7–15.7)
LIPASE SERPL-CCNC: 107 U/L (ref 13–60)
MCH RBC QN AUTO: 30.8 PG (ref 26.5–33)
MCHC RBC AUTO-ENTMCNC: 34.2 G/DL (ref 31.5–36.5)
MCV RBC AUTO: 90 FL (ref 78–100)
PLATELET # BLD AUTO: 151 10E3/UL (ref 150–450)
POTASSIUM SERPL-SCNC: 3.7 MMOL/L (ref 3.4–5.3)
PROT SERPL-MCNC: 6.1 G/DL (ref 6.4–8.3)
RBC # BLD AUTO: 3.38 10E6/UL (ref 3.8–5.2)
SODIUM SERPL-SCNC: 137 MMOL/L (ref 135–145)
WBC # BLD AUTO: 10.1 10E3/UL (ref 4–11)

## 2024-08-03 PROCEDURE — 83690 ASSAY OF LIPASE: CPT | Performed by: STUDENT IN AN ORGANIZED HEALTH CARE EDUCATION/TRAINING PROGRAM

## 2024-08-03 PROCEDURE — 258N000003 HC RX IP 258 OP 636

## 2024-08-03 PROCEDURE — 250N000013 HC RX MED GY IP 250 OP 250 PS 637

## 2024-08-03 PROCEDURE — 99207 PR NO BILLABLE SERVICE THIS VISIT: CPT | Performed by: STUDENT IN AN ORGANIZED HEALTH CARE EDUCATION/TRAINING PROGRAM

## 2024-08-03 PROCEDURE — 80053 COMPREHEN METABOLIC PANEL: CPT | Performed by: STUDENT IN AN ORGANIZED HEALTH CARE EDUCATION/TRAINING PROGRAM

## 2024-08-03 PROCEDURE — 250N000011 HC RX IP 250 OP 636: Performed by: STUDENT IN AN ORGANIZED HEALTH CARE EDUCATION/TRAINING PROGRAM

## 2024-08-03 PROCEDURE — 250N000013 HC RX MED GY IP 250 OP 250 PS 637: Performed by: INTERNAL MEDICINE

## 2024-08-03 PROCEDURE — 250N000011 HC RX IP 250 OP 636

## 2024-08-03 PROCEDURE — 82248 BILIRUBIN DIRECT: CPT | Performed by: STUDENT IN AN ORGANIZED HEALTH CARE EDUCATION/TRAINING PROGRAM

## 2024-08-03 PROCEDURE — 85027 COMPLETE CBC AUTOMATED: CPT | Performed by: STUDENT IN AN ORGANIZED HEALTH CARE EDUCATION/TRAINING PROGRAM

## 2024-08-03 PROCEDURE — 36415 COLL VENOUS BLD VENIPUNCTURE: CPT | Performed by: STUDENT IN AN ORGANIZED HEALTH CARE EDUCATION/TRAINING PROGRAM

## 2024-08-03 PROCEDURE — 99239 HOSP IP/OBS DSCHRG MGMT >30: CPT | Performed by: STUDENT IN AN ORGANIZED HEALTH CARE EDUCATION/TRAINING PROGRAM

## 2024-08-03 RX ORDER — MULTIVIT-MIN/IRON/FA/VIT K/LUT 4MG-200MCG
1 TABLET ORAL DAILY
COMMUNITY

## 2024-08-03 RX ORDER — GABAPENTIN 100 MG/1
300 CAPSULE ORAL 3 TIMES DAILY
Qty: 270 CAPSULE | Refills: 0 | Status: SHIPPED | OUTPATIENT
Start: 2024-08-03

## 2024-08-03 RX ORDER — CHOLECALCIFEROL (VITAMIN D3) 10(400)/ML
1 DROPS ORAL DAILY PRN
COMMUNITY

## 2024-08-03 RX ORDER — FLUOROMETHOLONE 0.1 %
1 SUSPENSION, DROPS(FINAL DOSAGE FORM)(ML) OPHTHALMIC (EYE) DAILY
COMMUNITY
Start: 2024-06-21

## 2024-08-03 RX ORDER — IBUPROFEN 600 MG/1
600 TABLET, FILM COATED ORAL EVERY 6 HOURS PRN
Status: DISCONTINUED | OUTPATIENT
Start: 2024-08-03 | End: 2024-08-03 | Stop reason: HOSPADM

## 2024-08-03 RX ORDER — FAMOTIDINE 20 MG/1
20 TABLET, FILM COATED ORAL EVERY EVENING
Status: DISCONTINUED | OUTPATIENT
Start: 2024-08-03 | End: 2024-08-03 | Stop reason: HOSPADM

## 2024-08-03 RX ADMIN — HYDROXYZINE HYDROCHLORIDE 25 MG: 25 TABLET, FILM COATED ORAL at 08:26

## 2024-08-03 RX ADMIN — CALCIUM CARBONATE (ANTACID) CHEW TAB 500 MG 1000 MG: 500 CHEW TAB at 04:52

## 2024-08-03 RX ADMIN — SODIUM CHLORIDE, POTASSIUM CHLORIDE, SODIUM LACTATE AND CALCIUM CHLORIDE: 600; 310; 30; 20 INJECTION, SOLUTION INTRAVENOUS at 08:31

## 2024-08-03 RX ADMIN — HYDROMORPHONE HYDROCHLORIDE 0.4 MG: 0.2 INJECTION, SOLUTION INTRAMUSCULAR; INTRAVENOUS; SUBCUTANEOUS at 01:00

## 2024-08-03 RX ADMIN — GABAPENTIN 100 MG: 100 CAPSULE ORAL at 08:25

## 2024-08-03 RX ADMIN — LISINOPRIL 5 MG: 5 TABLET ORAL at 08:27

## 2024-08-03 RX ADMIN — IBUPROFEN 600 MG: 600 TABLET, FILM COATED ORAL at 04:52

## 2024-08-03 RX ADMIN — HYDROMORPHONE HYDROCHLORIDE 0.4 MG: 0.2 INJECTION, SOLUTION INTRAMUSCULAR; INTRAVENOUS; SUBCUTANEOUS at 04:42

## 2024-08-03 RX ADMIN — ACETAMINOPHEN 650 MG: 325 TABLET, FILM COATED ORAL at 13:22

## 2024-08-03 RX ADMIN — ENOXAPARIN SODIUM 40 MG: 40 INJECTION SUBCUTANEOUS at 13:17

## 2024-08-03 RX ADMIN — AMLODIPINE BESYLATE 5 MG: 5 TABLET ORAL at 08:25

## 2024-08-03 RX ADMIN — DULOXETINE HYDROCHLORIDE 60 MG: 30 CAPSULE, DELAYED RELEASE ORAL at 08:25

## 2024-08-03 RX ADMIN — SODIUM CHLORIDE, POTASSIUM CHLORIDE, SODIUM LACTATE AND CALCIUM CHLORIDE: 600; 310; 30; 20 INJECTION, SOLUTION INTRAVENOUS at 02:40

## 2024-08-03 RX ADMIN — ACETAMINOPHEN 650 MG: 325 TABLET, FILM COATED ORAL at 02:40

## 2024-08-03 RX ADMIN — IBUPROFEN 600 MG: 600 TABLET, FILM COATED ORAL at 11:53

## 2024-08-03 RX ADMIN — ATORVASTATIN CALCIUM 10 MG: 10 TABLET, FILM COATED ORAL at 08:26

## 2024-08-03 RX ADMIN — ACETAMINOPHEN 650 MG: 325 TABLET, FILM COATED ORAL at 08:26

## 2024-08-03 ASSESSMENT — ACTIVITIES OF DAILY LIVING (ADL)
ADLS_ACUITY_SCORE: 23
ADLS_ACUITY_SCORE: 22
ADLS_ACUITY_SCORE: 23
ADLS_ACUITY_SCORE: 22
ADLS_ACUITY_SCORE: 23
ADLS_ACUITY_SCORE: 22
ADLS_ACUITY_SCORE: 23
ADLS_ACUITY_SCORE: 22
ADLS_ACUITY_SCORE: 23
ADLS_ACUITY_SCORE: 23
ADLS_ACUITY_SCORE: 22
ADLS_ACUITY_SCORE: 23
ADLS_ACUITY_SCORE: 23

## 2024-08-03 NOTE — PROGRESS NOTES
"[facility]    Medicine Progress Note - Hospitalist Service    Date of Admission:  8/1/2024    Assessment & Plan   Heidy Crain is a 59 year old female with past medical history of alcohol use, pancreatitis, hypertension, hyperlipidemia, anxiety, depression, PTSD, and obesity who presented on 8/1/2024 with abdominal pain and nausea x 3 days in the setting of recent alcohol use. [facility] pancreatitis. Improvement with IV fluids and pain management possible discharge 8/3.     # Acute alcohol induced pancreatitis  # S/p cholecystectomy 2015    Prior history of alcohol induced pancreatitis 12/2023. Cessation of alcohol since 4/2024 however restarted. She drinks a \"martini\" in the evenings, however not daily. Last drink x 3 days PTA. Experiencing diffuse abdominal pain most prevalent epigastric region with radiation to LUQ. Trying to manage pain at home with left over oxycodone. Having nausea without vomiting. Laboratory workup significant for lipase 821. Lipid panel without hypertriglyceridemia. There is no fever or leukocytosis. CT abdomen/pelvis showing mild pancreatic stranding, no homogenous enhancement, no parenchymal necrosis. Improved 8/3 possible discharge later today.   - Advancing diet as tolerated   - LR continuously 125 mL/hr  - Attempting to avoid IV hydromorphone with hope of discharge today but still available PRN     # Alcohol use disorder  # Hepatic steatosis    H/o alcohol abuse with cessation 12/2023 after admission for alcohol induced pancreatitis however has since started drinking 4/2024. Prior to pancreatitis she was drinking 1 \"martini\" a night. Reintroduced alcohol and now drinking 1 cocktail in the evenings, however not daily, and takes up to 2 week breaks at times. She states drinking for stress, and wishes to find a healthier outlet. Has tried naltrexone in the past however with poor response. Recently started gabapentin 300 mg TID 7/2024. LFTs largely normal outside " "of elevated ALT 71. CT abdomen/pelvis showing moderate hepatic steatosis. She denies history for alcohol withdrawals or seizures. No withdrawal signs on admission and has not been given anything for this PTA. She is low risk for alcohol withdrawal.  - Encourage alcohol cessation  - Continued gabapentin 300 mg TID    # Essential hypertension  Chronic. Blood pressures reviewed and well controlled on admission. Managed PTA with amlodipine 5 mg. Had thought to have been on lisinopril 5 mg but has stopped taking.   - Continued PTA amlodipine  - Stopped lisinopril as not taking PTA    # Anxiety  # Depression  # Post-traumatic stress disorder (PTSD)  Chronic. Mood appears appropriately. Managed PTA with duloxetine 60 mg, alprazolam 0.25 mg TID PRN, and hydroxyzine 25 mg TID PRN.  - Continued PTA duloxetine daily, alprazolam TID PRN, and hydroxyzine TID PRN.    # Hyperlipidemia  Chronic. Managed PTA with atorvastatin 10 mg.  - Continued PTA atorvastatin.     # Obesity    BMI 39.5 on admission. Contributes to over morbidity and mortality. Further discussion pending course.     # Renal stone, right    CT abdomen/pelvis showing stable non-obstructing 3 mm renal stone lower pole of right kidney.          Diet: Advance Diet as Tolerated: Full Liquid Diet    DVT Prophylaxis: Enoxaparin (Lovenox) SQ  Ward Catheter: Not present  Lines: None     Cardiac Monitoring: None  Code Status: Full Code      Clinically Significant Risk Factors          # Hypocalcemia: Lowest Ca = 8.5 mg/dL in last 2 days, will monitor and replace as appropriate                 #Precipitous drop in Hgb/Hct: Lowest Hgb this hospitalization: 10.4 g/dL. Will continue to monitor and treat/transfuse as appropriate.     # Severe Obesity: Estimated body mass index is 40.11 kg/m  as calculated from the following:    Height as of this encounter: 1.626 m (5' 4\").    Weight as of this encounter: 106 kg (233 lb 11 oz)., PRESENT ON ADMISSION       # " Financial/Environmental Concerns:           Disposition Plan     Medically Ready for Discharge: Anticipated Today     Kunal Chandler MD  Hospitalist Service  [facility]  Securely message with [employer] (more info)  Text page via [employer] Paging/Directory   ______________________________________________________________________    Interval History   Continued to have pain overnight but improved this morning. Denies having any nausea or vomiting this morning. Did note some increased pain and bloating after fluids for breakfast. Agreeable to seeing how things go for lunch and deciding on potential discharge.     Physical Exam   Vital Signs: Temp: 98  F (36.7  C) Temp src: Oral BP: 130/73 Pulse: 82   Resp: 18 SpO2: 95 % O2 Device: None (Room air)    Weight: 233 lbs 11 oz    General Appearance: Awake and alert, laying back in bed, In no acute distress   Respiratory: Breathing easily on room air   Cardiovascular: Regular rate and rhythm, extremities warm and well perfused   GI: Abdomen distended but soft, tender to palpation in the upper quadrants but significantly improved from yesterday. Without rebound or guarding   Skin: No rashes or lesions   Other: Appropriate mood and affect      Medical Decision Making       35 MINUTES SPENT BY ME on the date of service doing chart review, history, exam, documentation & further activities per the note.      Data     I have personally reviewed the following data over the past 24 hrs:    10.1  \   10.4 (L)   / 151     137 99 4.8 (L) /  124 (H)   3.7 29 0.52 \     ALT: 43 AST: 25 AP: 86 TBILI: 0.4   ALB: 3.5 TOT PROTEIN: 6.1 (L) LIPASE: 107 (H)       Imaging results reviewed over the past 24 hrs:   No results found for this or any previous visit (from the past 24 hour(s)).

## 2024-08-03 NOTE — MEDICATION SCRIBE - ADMISSION MEDICATION HISTORY
Medication Scribe Admission Medication History    Admission medication history is complete. The information provided in this note is only as accurate as the sources available at the time of the update.    Information Source(s): Patient, Clinic records, and [facility]  with patient by room phone.    Pertinent Information: Her doctor switched her from Lisinopril to Amlodipine due to her allergies.  Gabapentin is written for tid, but she only takes it bid.  She does not take Senokot at home, just Docusate.  Taking multiple supplements that did not get on her list when she first came in to hospital.  Has Alprazolam at home but her doctor prefers her to use Hydroxyzine first.    Changes made to PTA medication list:  Added: Digestive Enzyme, Probiotic, Vitamin A, Selenium, Beet Root, Centrum, Optimal Wellness MVI, FML eye drop.  Deleted: Additional B Complex.  Changed: None    Allergies reviewed with patient and updates made in EHR: yes, no change.    Medication History Completed By: Lexi Mancia 8/3/2024 10:50 AM    PTA Med List   Medication Sig Note Last Dose    ALPRAZolam (XANAX) 0.25 MG tablet Take 1 tablet (0.25 mg) by mouth 3 times daily as needed for anxiety  More than a month at prn    amLODIPine (NORVASC) 5 MG tablet Take 5 mg by mouth daily  8/1/2024 at am    atorvastatin (LIPITOR) 10 MG tablet Take 10 mg by mouth daily  7/31/2024 at pm    Digestive Enzyme CAPS Take 1 capsule by mouth daily as needed (with heavy meal)  Past Month at pen    docusate sodium (COLACE) 100 MG capsule Take 100 mg by mouth 3 times daily as needed for constipation  Past Week at prn    DULoxetine (CYMBALTA) 60 MG capsule Take 60 mg by mouth daily  8/1/2024 at am    Fish Oil-Cholecalciferol (OMEGA-3 FISH OIL/VITAMIN D3) 9997-5193 MG-UNIT CAPS Take 1 capsule by mouth 2 times daily  Past Week at am    fluorometholone (FML LIQUIFILM) 0.1 % ophthalmic suspension Place 1 drop into both eyes daily  8/1/2024 at am does not  need while here    gabapentin (NEURONTIN) 100 MG capsule Take 100 mg by mouth 2 times daily  8/1/2024 at am    hydrOXYzine HCl (ATARAX) 25 MG tablet Take 25 mg by mouth 3 times daily as needed for anxiety  7/31/2024 at prn    lisinopril (ZESTRIL) 5 MG tablet Take 1 tablet (5 mg) by mouth daily 8/3/2024: Discontinue this entry.  Script is only for 30 tabs and is from 12/23.  She is not taking this at home. More than a month at switched to Amlodipine    Misc Natural Products (BEET ROOT PO) Take 1 capsule by mouth daily  8/1/2024 at am    Multiple Vitamins-Minerals (CENTRUM MINIS WOMEN 50+) TABS Take 1 tablet by mouth daily  8/1/2024 at am    NONFORMULARY Take 1 tablet by mouth every evening Optimal Wellness Multivitamin  7/31/2024 at pm    oxyCODONE (ROXICODONE) 5 MG tablet Take 1 tablet (5 mg) by mouth every 4 hours as needed for severe pain (IF pain not managed with non-pharmacological and non-opioid interventions)  8/1/2024 at 3 tabs left    Probiotic Product (PROBIOTIC PO) Take 1 capsule by mouth every evening  7/31/2024 at pm    RESTASIS 0.05 % ophthalmic emulsion Place 1 drop into both eyes 2 times daily  8/1/2024 at has with her    SELENIUM PO Take 1 tablet by mouth every evening  7/31/2024 at pm    sennosides (SENOKOT) 8.6 MG tablet Take 1 tablet by mouth at bedtime  dc-does not use at home    tacrolimus (PROTOPIC) 0.1 % external ointment Apply topically 2 times daily as needed (for eyes)  Past Month at prn    triamcinolone (KENALOG) 0.1 % external ointment Apply topically 2 times daily as needed APPLY TOPICALLY TO THE CHEST AND NECK TWICE DAILY AS NEEDED  More than a month at on hand if needed    Vitamin A 3 MG CAPS Take 1 capsule by mouth every evening = 3000 mcg  7/31/2024 at pm    vitamin B complex with vitamin C (STRESS TAB) tablet Take 1 tablet by mouth daily  8/1/2024 at am    vitamin C (ASCORBIC ACID) 500 MG tablet Take 1 tablet by mouth 2 times daily  8/1/2024 at am    vitamin D3 (CHOLECALCIFEROL) 50  mcg (2000 units) tablet Take 1 tablet by mouth daily  8/1/2024 at am    Vitamin E 45 MG (100 UNIT) CAPS Take 1 tablet by mouth every evening  7/31/2024 at pm

## 2024-08-03 NOTE — PLAN OF CARE
Goal Outcome Evaluation:      Plan of Care Reviewed With: patient    Overall Patient Progress: no change  Problem: Adult Inpatient Plan of Care  Goal: Plan of Care Review  8/3/2024 0457 by Luisa Mooney RN  Outcome: Progressing  Flowsheets (Taken 8/3/2024 0457)  Plan of Care Reviewed With: patient  Overall Patient Progress: no change  Goal: Absence of Hospital-Acquired Illness or Injury  8/3/2024 0457 by Luisa Mooney RN  Outcome: Progressing  8/3/2024 0140 by Luisa Mooney RN  Intervention: Identify and Manage Fall Risk  Recent Flowsheet Documentation  Taken 8/2/2024 2300 by Luisa Mooney RN  Safety Promotion/Fall Prevention:   nonskid shoes/slippers when out of bed   patient and family education   assistive device/personal items within reach  Intervention: Prevent Skin Injury  Recent Flowsheet Documentation  Taken 8/2/2024 2300 by Luisa Mooney RN  Body Position: position changed independently  Goal: Optimal Comfort and Wellbeing  8/3/2024 0457 by Luisa Moonye RN  Outcome: Progressing  8/3/2024 0140 by Luisa Mooney RN  Reactivated  8/3/2024 0140 by Luisa Mooney RN  Reactivated  Intervention: Monitor Pain and Promote Comfort  Recent Flowsheet Documentation  Taken 8/3/2024 0456 by Luisa Mooney RN  Pain Management Interventions: medication (see MAR)  Taken 8/3/2024 0442 by Luisa Mooney RN  Pain Management Interventions: medication (see MAR)  Taken 8/3/2024 0124 by Luisa Mooney RN  Pain Management Interventions: medication (see MAR)  Taken 8/3/2024 0100 by Luisa Mooney RN  Pain Management Interventions: medication (see MAR)  Taken 8/2/2024 2340 by Luisa Mooney RN  Pain Management Interventions: medication (see MAR)  Taken 8/2/2024 2237 by Luisa Mooney RN  Pain Management Interventions: medication (see MAR)  Taken 8/2/2024 2020 by Luisa Mooney RN  Pain Management Interventions: medication (see MAR)  Taken 8/2/2024 2002 by Luisa Mooney RN  Pain  Management Interventions: medication (see MAR)  Intervention: Provide Person-Centered Care  Recent Flowsheet Documentation  Taken 8/2/2024 2300 by Luisa Mooney RN  Trust Relationship/Rapport:   care explained   choices provided  Goal: Readiness for Transition of Care  8/3/2024 0457 by Luisa Mooney RN  Outcome: Progressing  Intervention: Prevent Skin Injury  Recent Flowsheet Documentation  Taken 8/2/2024 2300 by Luisa Mooney RN  Body Position: position changed independently  Goal: Readiness for Transition of Care  Outcome: Progressing     Problem: Pancreatitis  Goal: Fluid and Electrolyte Balance  Outcome: Progressing  Goal: Absence of Infection Signs and Symptoms  Outcome: Progressing  Goal: Optimal Nutrition Delivery  Outcome: Progressing  Goal: Optimal Pain Control and Function  Outcome: Progressing  Intervention: Monitor and Manage Pain  Recent Flowsheet Documentation  Taken 8/3/2024 0456 by Luisa Mooney RN  Pain Management Interventions: medication (see MAR)  Taken 8/3/2024 0442 by Luisa Mooney RN  Pain Management Interventions: medication (see MAR)  Taken 8/3/2024 0124 by Luisa Mooney RN  Pain Management Interventions: medication (see MAR)  Taken 8/3/2024 0100 by Luisa Mooney RN  Pain Management Interventions: medication (see MAR)  Taken 8/2/2024 2340 by Luisa Mooney RN  Pain Management Interventions: medication (see MAR)  Taken 8/2/2024 2237 by Luisa Mooney RN  Pain Management Interventions: medication (see MAR)  Taken 8/2/2024 2020 by Luisa Mooney RN  Pain Management Interventions: medication (see MAR)  Taken 8/2/2024 2002 by Luisa Mooney RN  Pain Management Interventions: medication (see MAR)  Goal: Effective Oxygenation and Ventilation  Outcome: Progressing  Intervention: Optimize Oxygenation and Ventilation  Recent Flowsheet Documentation  Taken 8/2/2024 2300 by Luisa Mooney RN  Activity Management: up ad juan     Problem: Skin Injury Risk  Increased  Goal: Skin Health and Integrity  Outcome: Progressing  Intervention: Optimize Skin Protection  Recent Flowsheet Documentation  Taken 8/2/2024 2300 by Luisa Mooney, RN  Activity Management: up ad juan     Pain is managed with current interventions. MD contacted to add Ibuprofen. No nausea. Tums given for stomach discomfort.

## 2024-08-03 NOTE — DISCHARGE SUMMARY
"[facility]  Hospitalist Discharge Summary      Date of Admission:  8/1/2024  Date of Discharge:  8/3/2024  Discharging Provider: Kunal Chandler MD  Discharge Service: Hospitalist Service    Discharge Diagnoses   # Acute alcohol induced pancreatitis  # S/p cholecystectomy 2015  # Alcohol use disorder  # Hepatic steatosis  # Essential hypertension  # Anxiety  # Depression  # Post-traumatic stress disorder (PTSD)  # Hyperlipidemia  # Obesity  # Renal stone, right    Clinically Significant Risk Factors     # Severe Obesity: Estimated body mass index is 40.11 kg/m  as calculated from the following:    Height as of this encounter: 1.626 m (5' 4\").    Weight as of this encounter: 106 kg (233 lb 11 oz).       Follow-ups Needed After Discharge   Follow-up Appointments     Follow-up and recommended labs and tests       Follow up with primary care provider, Jason Mojica, within 7 days to   evaluate medication change and for hospital follow- up.            Unresulted Labs Ordered in the Past 30 Days of this Admission       No orders found from 7/2/2024 to 8/2/2024.        These results will be followed up by Kunal Chandler MD.    Discharge Disposition   Discharged to home  Condition at discharge: Stable    Hospital Course   Heidy Crain is a 59 year old female with past medical history of alcohol use, pancreatitis, hypertension, hyperlipidemia, anxiety, depression, PTSD, and obesity who presented on 8/1/2024 with abdominal pain and nausea x 3 days in the setting of recent alcohol use. [facility] pancreatitis. Improvement with IV fluids and pain management discharged 8/3.     # Acute alcohol induced pancreatitis  # S/p cholecystectomy 2015    Prior history of alcohol induced pancreatitis 12/2023. Cessation of alcohol since 4/2024 however restarted. She drinks a \"martini\" in the evenings, however not daily. Last drink x 3 days PTA. Experiencing diffuse abdominal pain most prevalent " "epigastric region with radiation to LUQ. Trying to manage pain at home with left over oxycodone. Having nausea without vomiting. Laboratory workup significant for lipase 821. Lipid panel without hypertriglyceridemia. There is no fever or leukocytosis. CT abdomen/pelvis showing mild pancreatic stranding, no homogenous enhancement, no parenchymal necrosis. Improved 8/3 discharged back to home.   - Discuss caution with diet and return criteria with patient     # Alcohol use disorder  # Hepatic steatosis    H/o alcohol abuse with cessation 12/2023 after admission for alcohol induced pancreatitis however has since started drinking 4/2024. Prior to pancreatitis she was drinking 1 \"martini\" a night. Reintroduced alcohol and now drinking 1 cocktail in the evenings, however not daily, and takes up to 2 week breaks at times. She states drinking for stress, and wishes to find a healthier outlet. Has tried naltrexone in the past however with poor response. Recently started gabapentin 300 mg TID 7/2024. LFTs largely normal outside of elevated ALT 71. CT abdomen/pelvis showing moderate hepatic steatosis. She denies history for alcohol withdrawals or seizures. No withdrawal signs on admission and has not been given anything for this PTA. She is low risk for alcohol withdrawal.  - Encourage alcohol cessation  - Continued gabapentin increased from 100 mg bid to 300 mg TID    # Essential hypertension  Chronic. Blood pressures reviewed and well controlled on admission. Managed PTA with amlodipine 5 mg. Had thought to have been on lisinopril 5 mg but has stopped taking.   - Continued PTA amlodipine  - Stopped lisinopril as not taking PTA    # Anxiety  # Depression  # Post-traumatic stress disorder (PTSD)  Chronic. Mood appears appropriately. Managed PTA with duloxetine 60 mg, alprazolam 0.25 mg TID PRN, and hydroxyzine 25 mg TID PRN.  - Continued PTA duloxetine daily, alprazolam TID PRN, and hydroxyzine TID PRN.    # " Hyperlipidemia  Chronic. Managed PTA with atorvastatin 10 mg.  - Continued PTA atorvastatin.     # Obesity    BMI 39.5 on admission. Contributes to over morbidity and mortality. Further discussion pending course.     # Renal stone, right    CT abdomen/pelvis showing stable non-obstructing 3 mm renal stone lower pole of right kidney.    Consultations This Hospital Stay   None    Code Status   Full Code    Time Spent on this Encounter   I, Kunal Chandler MD, personally saw the patient today and spent greater than 30 minutes discharging this patient.       Kunal Chandler MD  [facility] SURGICAL  [address] [address]  Phone: [phone]  Fax: [phone]  ______________________________________________________________________    Physical Exam   Vital Signs: Temp: 98  F (36.7  C) Temp src: Oral BP: 130/73 Pulse: 82   Resp: 18 SpO2: 95 % O2 Device: None (Room air)    Weight: 233 lbs 11 oz    General Appearance:  Awake and alert, laying back in bed, appears uncomfortable but no in a cute distress   Respiratory: Breathing easily on room air   Cardiovascular: Regular rate and rhythm, extremities warm and well perfused   GI: Abdomen distended but soft, mildly tender to palpation in the upper quadrants but significantly improved   Skin: No rashes or lesions   Other:  Appropriate mood and affect        Primary Care Physician   Jason Mojica    Discharge Orders      Reason for your hospital stay    You were admitted with acute pancreatitis that was likely triggered from alcohol use. Your symptoms improved with supportive care in the hospital.     Follow-up and recommended labs and tests     Follow up with primary care provider, Jason Mojica, within 7 days to evaluate medication change and for hospital follow- up.     Activity    Your activity upon discharge: activity as tolerated     Diet    Follow this diet upon discharge: Regular adult diet       Significant  Results and Procedures   Results for orders placed or performed during the hospital encounter of 08/01/24   CT Abdomen Pelvis w Contrast    Narrative    EXAM: CT ABDOMEN PELVIS W CONTRAST  LOCATION: [facility]  DATE: 8/1/2024    INDICATION: Recurrent abdominal pain, history of pancreatitis.  COMPARISON: 12/28/2023.  TECHNIQUE: CT scan of the abdomen and pelvis was performed following injection of IV contrast. Multiplanar reformats were obtained. Dose reduction techniques were used.  CONTRAST: ISOVUE 370 90mL.    FINDINGS:   LOWER CHEST: Mild atelectasis. Coronary calcifications.    HEPATOBILIARY: Moderate hepatic steatosis. Cholecystectomy.    PANCREAS: Mild peripancreatic stranding. Homogeneous enhancement.    SPLEEN: Normal.    ADRENAL GLANDS: Normal.    KIDNEYS/BLADDER: Stable nonobstructing 3 mm right renal lower pole stone.    BOWEL: Unremarkable.    LYMPH NODES: No adenopathy.    VASCULATURE: Nonaneurysmal aorta. Minimal atherosclerosis. Patent aortic branch vessels. Patent draining mesenteric and portal veins.    PELVIC ORGANS: Small amount of free pelvic fluid.    MUSCULOSKELETAL: Degenerative changes spine.      Impression    IMPRESSION:     1.  Findings of acute pancreatitis. No parenchymal necrosis at this time.    2.  Minimal free fluid. No free air. No collection.    3.  Moderate hepatic steatosis.         Discharge Medications   Current Discharge Medication List        CONTINUE these medications which have CHANGED    Details   gabapentin (NEURONTIN) 100 MG capsule Take 3 capsules (300 mg) by mouth 3 times daily  Qty: 270 capsule, Refills: 0    Associated Diagnoses: Alcohol-induced acute pancreatitis without infection or necrosis           CONTINUE these medications which have NOT CHANGED    Details   ALPRAZolam (XANAX) 0.25 MG tablet Take 1 tablet (0.25 mg) by mouth 3 times daily as needed for anxiety  Qty: 20 tablet, Refills: 0    Associated Diagnoses: Alcohol abuse       amLODIPine (NORVASC) 5 MG tablet Take 5 mg by mouth daily      atorvastatin (LIPITOR) 10 MG tablet Take 10 mg by mouth daily      Digestive Enzyme CAPS Take 1 capsule by mouth daily as needed (with heavy meal)      docusate sodium (COLACE) 100 MG capsule Take 100 mg by mouth 3 times daily as needed for constipation      DULoxetine (CYMBALTA) 60 MG capsule Take 60 mg by mouth daily      Fish Oil-Cholecalciferol (OMEGA-3 FISH OIL/VITAMIN D3) 2303-3429 MG-UNIT CAPS Take 1 capsule by mouth 2 times daily      fluorometholone (FML LIQUIFILM) 0.1 % ophthalmic suspension Place 1 drop into both eyes daily      hydrOXYzine HCl (ATARAX) 25 MG tablet Take 25 mg by mouth 3 times daily as needed for anxiety      Misc Natural Products (BEET ROOT PO) Take 1 capsule by mouth daily      Multiple Vitamins-Minerals (CENTRUM MINIS WOMEN 50+) TABS Take 1 tablet by mouth daily      NONFORMULARY Take 1 tablet by mouth every evening Optimal Wellness Multivitamin      Probiotic Product (PROBIOTIC PO) Take 1 capsule by mouth every evening      RESTASIS 0.05 % ophthalmic emulsion Place 1 drop into both eyes 2 times daily      SELENIUM PO Take 1 tablet by mouth every evening      sennosides (SENOKOT) 8.6 MG tablet Take 1 tablet by mouth at bedtime      tacrolimus (PROTOPIC) 0.1 % external ointment Apply topically 2 times daily as needed (for eyes)      triamcinolone (KENALOG) 0.1 % external ointment Apply topically 2 times daily as needed APPLY TOPICALLY TO THE CHEST AND NECK TWICE DAILY AS NEEDED      Vitamin A 3 MG CAPS Take 1 capsule by mouth every evening = 3000 mcg      vitamin B complex with vitamin C (STRESS TAB) tablet Take 1 tablet by mouth daily      vitamin C (ASCORBIC ACID) 500 MG tablet Take 1 tablet by mouth 2 times daily      vitamin D3 (CHOLECALCIFEROL) 50 mcg (2000 units) tablet Take 1 tablet by mouth daily      Vitamin E 45 MG (100 UNIT) CAPS Take 1 tablet by mouth every evening           STOP taking these medications        lisinopril (ZESTRIL) 5 MG tablet Comments:   Reason for Stopping:         oxyCODONE (ROXICODONE) 5 MG tablet Comments:   Reason for Stopping:             Allergies   Allergies   Allergen Reactions    Atorvastatin Headache    Escitalopram Rash

## 2024-08-03 NOTE — PROGRESS NOTES
[facility] DISCHARGE NOTE    Patient discharged to home at 4:09 PM via ambulation. Accompanied by mother and staff. Discharge instructions reviewed with patient, opportunity offered to ask questions. Prescriptions sent to patients preferred pharmacy. All belongings sent with patient.    Meaghan Riggs RN

## 2024-10-10 ENCOUNTER — LAB (OUTPATIENT)
Dept: LAB | Facility: CLINIC | Age: 59
End: 2024-10-10
Payer: COMMERCIAL